# Patient Record
Sex: FEMALE | Race: WHITE | NOT HISPANIC OR LATINO | Employment: FULL TIME | URBAN - METROPOLITAN AREA
[De-identification: names, ages, dates, MRNs, and addresses within clinical notes are randomized per-mention and may not be internally consistent; named-entity substitution may affect disease eponyms.]

---

## 2017-09-19 ENCOUNTER — HOSPITAL ENCOUNTER (EMERGENCY)
Facility: MEDICAL CENTER | Age: 22
End: 2017-09-19
Attending: EMERGENCY MEDICINE
Payer: COMMERCIAL

## 2017-09-19 ENCOUNTER — APPOINTMENT (OUTPATIENT)
Dept: RADIOLOGY | Facility: MEDICAL CENTER | Age: 22
End: 2017-09-19
Attending: EMERGENCY MEDICINE
Payer: COMMERCIAL

## 2017-09-19 VITALS
TEMPERATURE: 98 F | OXYGEN SATURATION: 96 % | HEART RATE: 70 BPM | RESPIRATION RATE: 16 BRPM | HEIGHT: 62 IN | SYSTOLIC BLOOD PRESSURE: 141 MMHG | WEIGHT: 151.46 LBS | DIASTOLIC BLOOD PRESSURE: 68 MMHG | BODY MASS INDEX: 27.87 KG/M2

## 2017-09-19 DIAGNOSIS — N30.90 CYSTITIS: ICD-10-CM

## 2017-09-19 LAB
ALBUMIN SERPL BCP-MCNC: 4.2 G/DL (ref 3.2–4.9)
ALBUMIN/GLOB SERPL: 1.3 G/DL
ALP SERPL-CCNC: 75 U/L (ref 30–99)
ALT SERPL-CCNC: 11 U/L (ref 2–50)
ANION GAP SERPL CALC-SCNC: 8 MMOL/L (ref 0–11.9)
APPEARANCE UR: CLEAR
AST SERPL-CCNC: 15 U/L (ref 12–45)
BACTERIA #/AREA URNS HPF: ABNORMAL /HPF
BACTERIA GENITAL QL WET PREP: NORMAL
BASOPHILS # BLD AUTO: 0.5 % (ref 0–1.8)
BASOPHILS # BLD: 0.03 K/UL (ref 0–0.12)
BILIRUB SERPL-MCNC: 0.3 MG/DL (ref 0.1–1.5)
BILIRUB UR QL STRIP.AUTO: NEGATIVE
BUN SERPL-MCNC: 11 MG/DL (ref 8–22)
C TRACH DNA SPEC QL NAA+PROBE: NEGATIVE
CALCIUM SERPL-MCNC: 9.5 MG/DL (ref 8.5–10.5)
CHLORIDE SERPL-SCNC: 109 MMOL/L (ref 96–112)
CO2 SERPL-SCNC: 21 MMOL/L (ref 20–33)
COLOR UR: YELLOW
CREAT SERPL-MCNC: 0.55 MG/DL (ref 0.5–1.4)
EOSINOPHIL # BLD AUTO: 0.17 K/UL (ref 0–0.51)
EOSINOPHIL NFR BLD: 2.6 % (ref 0–6.9)
EPI CELLS #/AREA URNS HPF: ABNORMAL /HPF
ERYTHROCYTE [DISTWIDTH] IN BLOOD BY AUTOMATED COUNT: 38.4 FL (ref 35.9–50)
GFR SERPL CREATININE-BSD FRML MDRD: >60 ML/MIN/1.73 M 2
GLOBULIN SER CALC-MCNC: 3.3 G/DL (ref 1.9–3.5)
GLUCOSE SERPL-MCNC: 90 MG/DL (ref 65–99)
GLUCOSE UR STRIP.AUTO-MCNC: NEGATIVE MG/DL
HCG SERPL QL: NEGATIVE
HCT VFR BLD AUTO: 42.2 % (ref 37–47)
HGB BLD-MCNC: 14.4 G/DL (ref 12–16)
HYALINE CASTS #/AREA URNS LPF: ABNORMAL /LPF
IMM GRANULOCYTES # BLD AUTO: 0.01 K/UL (ref 0–0.11)
IMM GRANULOCYTES NFR BLD AUTO: 0.2 % (ref 0–0.9)
KETONES UR STRIP.AUTO-MCNC: NEGATIVE MG/DL
LEUKOCYTE ESTERASE UR QL STRIP.AUTO: ABNORMAL
LIPASE SERPL-CCNC: 25 U/L (ref 11–82)
LYMPHOCYTES # BLD AUTO: 3.28 K/UL (ref 1–4.8)
LYMPHOCYTES NFR BLD: 50.2 % (ref 22–41)
MCH RBC QN AUTO: 30.1 PG (ref 27–33)
MCHC RBC AUTO-ENTMCNC: 34.1 G/DL (ref 33.6–35)
MCV RBC AUTO: 88.1 FL (ref 81.4–97.8)
MICRO URNS: ABNORMAL
MONOCYTES # BLD AUTO: 0.39 K/UL (ref 0–0.85)
MONOCYTES NFR BLD AUTO: 6 % (ref 0–13.4)
N GONORRHOEA DNA SPEC QL NAA+PROBE: NEGATIVE
NEUTROPHILS # BLD AUTO: 2.66 K/UL (ref 2–7.15)
NEUTROPHILS NFR BLD: 40.5 % (ref 44–72)
NITRITE UR QL STRIP.AUTO: NEGATIVE
NRBC # BLD AUTO: 0 K/UL
NRBC BLD AUTO-RTO: 0 /100 WBC
PH UR STRIP.AUTO: 5.5 [PH]
PLATELET # BLD AUTO: 200 K/UL (ref 164–446)
PMV BLD AUTO: 10.1 FL (ref 9–12.9)
POTASSIUM SERPL-SCNC: 3.8 MMOL/L (ref 3.6–5.5)
PROT SERPL-MCNC: 7.5 G/DL (ref 6–8.2)
PROT UR QL STRIP: NEGATIVE MG/DL
RBC # BLD AUTO: 4.79 M/UL (ref 4.2–5.4)
RBC # URNS HPF: ABNORMAL /HPF
RBC UR QL AUTO: ABNORMAL
SIGNIFICANT IND 70042: NORMAL
SITE SITE: NORMAL
SODIUM SERPL-SCNC: 138 MMOL/L (ref 135–145)
SOURCE SOURCE: NORMAL
SP GR UR STRIP.AUTO: 1.02
SPECIMEN SOURCE: NORMAL
UROBILINOGEN UR STRIP.AUTO-MCNC: 0.2 MG/DL
WBC # BLD AUTO: 6.5 K/UL (ref 4.8–10.8)
WBC #/AREA URNS HPF: ABNORMAL /HPF

## 2017-09-19 PROCEDURE — 87591 N.GONORRHOEAE DNA AMP PROB: CPT

## 2017-09-19 PROCEDURE — 81001 URINALYSIS AUTO W/SCOPE: CPT

## 2017-09-19 PROCEDURE — 83690 ASSAY OF LIPASE: CPT

## 2017-09-19 PROCEDURE — 36415 COLL VENOUS BLD VENIPUNCTURE: CPT

## 2017-09-19 PROCEDURE — 76830 TRANSVAGINAL US NON-OB: CPT

## 2017-09-19 PROCEDURE — 87491 CHLMYD TRACH DNA AMP PROBE: CPT

## 2017-09-19 PROCEDURE — 84703 CHORIONIC GONADOTROPIN ASSAY: CPT

## 2017-09-19 PROCEDURE — 85025 COMPLETE CBC W/AUTO DIFF WBC: CPT

## 2017-09-19 PROCEDURE — 80053 COMPREHEN METABOLIC PANEL: CPT

## 2017-09-19 PROCEDURE — A9270 NON-COVERED ITEM OR SERVICE: HCPCS | Performed by: EMERGENCY MEDICINE

## 2017-09-19 PROCEDURE — 700102 HCHG RX REV CODE 250 W/ 637 OVERRIDE(OP): Performed by: EMERGENCY MEDICINE

## 2017-09-19 PROCEDURE — 99284 EMERGENCY DEPT VISIT MOD MDM: CPT

## 2017-09-19 RX ORDER — NITROFURANTOIN 25; 75 MG/1; MG/1
100 CAPSULE ORAL 2 TIMES DAILY
Qty: 14 CAP | Refills: 0 | Status: SHIPPED | OUTPATIENT
Start: 2017-09-19 | End: 2017-09-26

## 2017-09-19 RX ORDER — NITROFURANTOIN 25; 75 MG/1; MG/1
100 CAPSULE ORAL 2 TIMES DAILY
Qty: 14 CAP | Refills: 0 | Status: SHIPPED | OUTPATIENT
Start: 2017-09-19 | End: 2017-09-19

## 2017-09-19 RX ORDER — NITROFURANTOIN 25; 75 MG/1; MG/1
100 CAPSULE ORAL ONCE
Status: COMPLETED | OUTPATIENT
Start: 2017-09-19 | End: 2017-09-19

## 2017-09-19 RX ADMIN — NITROFURANTOIN (MONOHYDRATE/MACROCRYSTALS) 100 MG: 75; 25 CAPSULE ORAL at 06:49

## 2017-09-19 ASSESSMENT — LIFESTYLE VARIABLES: DO YOU DRINK ALCOHOL: NO

## 2017-09-19 ASSESSMENT — PAIN SCALES - GENERAL: PAINLEVEL_OUTOF10: 7

## 2017-09-19 NOTE — ED NOTES
Pt. Ambulated to Red 11 with steady gait. Pt. States she has been having severe abdominal cramping. Pt. Also states that she is on her period, but that it is lighter than usual. Urine sample obtained and sent to lab. Pt. Updated on the POC for ERP to see Call light within reach. Will continue to monitor.

## 2017-09-19 NOTE — ED NOTES
Discharge instructions given to patient, prescriptions provided, a verbal understanding of all instructions was stated.  Pt preferred to walk out accompanied by boyfriend. VSS,  all belongings accounted for.

## 2017-09-19 NOTE — DISCHARGE INSTRUCTIONS
Urinary Tract Infection  A urinary tract infection (UTI) can occur any place along the urinary tract. The tract includes the kidneys, ureters, bladder, and urethra. A type of germ called bacteria often causes a UTI. UTIs are often helped with antibiotic medicine.   HOME CARE   · If given, take antibiotics as told by your doctor. Finish them even if you start to feel better.  · Drink enough fluids to keep your pee (urine) clear or pale yellow.  · Avoid tea, drinks with caffeine, and bubbly (carbonated) drinks.  · Pee often. Avoid holding your pee in for a long time.  · Pee before and after having sex (intercourse).  · Wipe from front to back after you poop (bowel movement) if you are a woman. Use each tissue only once.  GET HELP RIGHT AWAY IF:   · You have back pain.  · You have lower belly (abdominal) pain.  · You have chills.  · You feel sick to your stomach (nauseous).  · You throw up (vomit).  · Your burning or discomfort with peeing does not go away.  · You have a fever.  · Your symptoms are not better in 3 days.  MAKE SURE YOU:   · Understand these instructions.  · Will watch your condition.  · Will get help right away if you are not doing well or get worse.     This information is not intended to replace advice given to you by your health care provider. Make sure you discuss any questions you have with your health care provider.     Document Released: 06/05/2009 Document Revised: 01/08/2016 Document Reviewed: 07/18/2013  [employer] Interactive Patient Education ©2016 [employer]

## 2017-09-19 NOTE — ED PROVIDER NOTES
"ED Provider Note    CHIEF COMPLAINT  Chief Complaint   Patient presents with   • Abdominal Cramps     Mid-low abdomen and RLQ x3 weeks, intermittent cramps after intercourse.         HPI  Vidhya Abdullahi is a 22 y.o. female who presentsWith lower abdominal pain and cramping. Patient started having periodic abdominal pain over the last several weeks. It is located in her lower pelvis worse on the right side. She notices it more after intercourse. Does not have pain with intercourse. She's had urinary frequency and dysuria. Her pain does not radiate to the back or flank. She has not had a fever. No vaginal discharge. She is currently menstruating. Reports this seems to be a lighter period than normal. No dizziness or lightheadedness. No chest pain shortness of breath, cough, abnormal bowel movements    Patient reports that her pain lasted for about an hour today and is now gone.    REVIEW OF SYSTEMS  As per HPI  All other systems are negative.     PAST MEDICAL HISTORY  Past Medical History:   Diagnosis Date   • Depression    • No known health problems    No STDs in the past.    FAMILY HISTORY  History reviewed. No pertinent family history.    SOCIAL HISTORY  Social History   Substance Use Topics   • Smoking status: Former Smoker     Packs/day: 1.00     Types: Cigarettes   • Smokeless tobacco: Never Used   • Alcohol use No       SURGICAL HISTORY  No past surgical history on file.    CURRENT MEDICATIONS  Home Medications    **Home medications have not yet been reviewed for this encounter**         ALLERGIES  No Known Allergies    PHYSICAL EXAM  VITAL SIGNS: /68   Pulse 70   Temp 36.7 °C (98 °F)   Resp 16   Ht 1.575 m (5' 2\")   Wt 68.7 kg (151 lb 7.3 oz)   LMP 09/19/2017   SpO2 96%   BMI 27.70 kg/m²   Constitutional: Awake and alert  HENT: Normal inspection  Eyes: Sclera white  Neck: Normal range of motion  Cardiovascular: Normal heart rate, Normal rhythm  Thorax & Lungs: Normal breath sounds, No respiratory " distress, No wheezing, No chest tenderness.   Abdomen:Soft, nondistended, no tenderness. No rebound or peritonitis.  Genitalia: Normal external female genitalia. Physiologic discharge within the vault. Cervix is normal. Mild bleeding from the os. No cervical motion tenderness, adnexal masses or tenderness  Skin: No rash.   Back: No tenderness, No CVA tenderness.   Extremities: Intact, symmetric distal pulses, no edema.  Neurologic: Grossly normal    RADIOLOGY/PROCEDURES  US-GYN-PELVIS TRANSVAGINAL   Final Result         1.  Anteverted uterus.   2.  Trace free fluid in the cul-de-sac and adjacent to the right adnexa.           Imaging is interpreted by radiologist    Labs:   Results for orders placed or performed during the hospital encounter of 09/19/17   CBC WITH DIFFERENTIAL   Result Value Ref Range    WBC 6.5 4.8 - 10.8 K/uL    RBC 4.79 4.20 - 5.40 M/uL    Hemoglobin 14.4 12.0 - 16.0 g/dL    Hematocrit 42.2 37.0 - 47.0 %    MCV 88.1 81.4 - 97.8 fL    MCH 30.1 27.0 - 33.0 pg    MCHC 34.1 33.6 - 35.0 g/dL    RDW 38.4 35.9 - 50.0 fL    Platelet Count 200 164 - 446 K/uL    MPV 10.1 9.0 - 12.9 fL    Neutrophils-Polys 40.50 (L) 44.00 - 72.00 %    Lymphocytes 50.20 (H) 22.00 - 41.00 %    Monocytes 6.00 0.00 - 13.40 %    Eosinophils 2.60 0.00 - 6.90 %    Basophils 0.50 0.00 - 1.80 %    Immature Granulocytes 0.20 0.00 - 0.90 %    Nucleated RBC 0.00 /100 WBC    Neutrophils (Absolute) 2.66 2.00 - 7.15 K/uL    Lymphs (Absolute) 3.28 1.00 - 4.80 K/uL    Monos (Absolute) 0.39 0.00 - 0.85 K/uL    Eos (Absolute) 0.17 0.00 - 0.51 K/uL    Baso (Absolute) 0.03 0.00 - 0.12 K/uL    Immature Granulocytes (abs) 0.01 0.00 - 0.11 K/uL    NRBC (Absolute) 0.00 K/uL   COMP METABOLIC PANEL   Result Value Ref Range    Sodium 138 135 - 145 mmol/L    Potassium 3.8 3.6 - 5.5 mmol/L    Chloride 109 96 - 112 mmol/L    Co2 21 20 - 33 mmol/L    Anion Gap 8.0 0.0 - 11.9    Glucose 90 65 - 99 mg/dL    Bun 11 8 - 22 mg/dL    Creatinine 0.55 0.50 - 1.40  mg/dL    Calcium 9.5 8.5 - 10.5 mg/dL    AST(SGOT) 15 12 - 45 U/L    ALT(SGPT) 11 2 - 50 U/L    Alkaline Phosphatase 75 30 - 99 U/L    Total Bilirubin 0.3 0.1 - 1.5 mg/dL    Albumin 4.2 3.2 - 4.9 g/dL    Total Protein 7.5 6.0 - 8.2 g/dL    Globulin 3.3 1.9 - 3.5 g/dL    A-G Ratio 1.3 g/dL   LIPASE   Result Value Ref Range    Lipase 25 11 - 82 U/L   ESTIMATED GFR   Result Value Ref Range    GFR If African American >60 >60 mL/min/1.73 m 2    GFR If Non African American >60 >60 mL/min/1.73 m 2   URINALYSIS   Result Value Ref Range    Color Yellow     Character Clear     Specific Gravity 1.025 <1.035    Ph 5.5 5.0 - 8.0    Glucose Negative Negative mg/dL    Ketones Negative Negative mg/dL    Protein Negative Negative mg/dL    Bilirubin Negative Negative    Urobilinogen, Urine 0.2 Negative    Nitrite Negative Negative    Leukocyte Esterase Small (A) Negative    Occult Blood Trace (A) Negative    Micro Urine Req Microscopic    HCG QUAL SERUM   Result Value Ref Range    Beta-Hcg Qualitative Serum Negative Negative   URINE MICROSCOPIC (W/UA)   Result Value Ref Range    WBC  (A) /hpf    RBC  (A) /hpf    Bacteria Many (A) None /hpf    Epithelial Cells Few /hpf    Hyaline Cast 0-2 /lpf   WET PREP   Result Value Ref Range    Significant Indicator NEG     Source GEN     Site VAGINAL     Wet Prep For Parasites       No yeast.  No motile Trichomonas seen.  No clue cells seen.     CHLAMYDIA & GC BY PCR   Result Value Ref Range    Source Vaginal          COURSE & MEDICAL DECISION MAKING  The patient presents to the ER with pelvic pain. Differential includes UTI, ovarian cyst, ovarian torsion, mittelschmerz, PID, cervicitis, appendicitis etc. Her exam is reassuring. Obtained workup. She has not had any large cysts. She does have a small amount of free fluid in the right pelvis. Perhaps related to this pain. Pelvic exam was unremarkable. No suggestion of PID. Wet prep was negative. Await GC and chlamydia probe. Urinalysis  revealed urinary tract infection she does not have upper tract symptoms. She will be treated with Macrobid. Given 1st dose in the ER. Advised plenty of fluids. Given standard instructions for UTI. Advised follow-up with primary doctor in 1 week. Return to the ER for concern.    FINAL IMPRESSION  1. Right lower quadrant abdominal pain  2. Urinary tract infection        This dictation was created using voice recognition software. The accuracy of the dictation is limited to the abilities of the software.  The nursing notes were reviewed and certain aspects of this information were incorporated into this note.    Electronically signed by: Rubio Christie, 9/19/2017 10:20 AM

## 2017-09-19 NOTE — ED NOTES
Vidhya Abdullahi  22 y.o.  Chief Complaint   Patient presents with   • Abdominal Cramps     Mid-low abdomen and RLQ x3 weeks, intermittent cramps after intercourse,      Patient denies n/v/d, no difficulty or pain voiding, no abnormal vaginal discharge, reports 1 sexual partner since onset of symptoms.         Explained wait time and triage process to pt. Pt placed back out in lobby, told to notify ED tech or triage RN of any changes, verbalized understanding.

## 2018-09-30 ENCOUNTER — HOSPITAL ENCOUNTER (EMERGENCY)
Facility: MEDICAL CENTER | Age: 23
End: 2018-09-30
Attending: EMERGENCY MEDICINE
Payer: COMMERCIAL

## 2018-09-30 VITALS
WEIGHT: 137.13 LBS | SYSTOLIC BLOOD PRESSURE: 118 MMHG | HEART RATE: 86 BPM | HEIGHT: 62 IN | TEMPERATURE: 97.9 F | RESPIRATION RATE: 16 BRPM | BODY MASS INDEX: 25.23 KG/M2 | DIASTOLIC BLOOD PRESSURE: 74 MMHG | OXYGEN SATURATION: 98 %

## 2018-09-30 DIAGNOSIS — N73.9 PID (PELVIC INFLAMMATORY DISEASE): ICD-10-CM

## 2018-09-30 DIAGNOSIS — N39.0 ACUTE UTI: ICD-10-CM

## 2018-09-30 DIAGNOSIS — Z20.2 SYPHILIS CONTACT: ICD-10-CM

## 2018-09-30 LAB
APPEARANCE UR: ABNORMAL
BACTERIA #/AREA URNS HPF: ABNORMAL /HPF
BACTERIA GENITAL QL WET PREP: NORMAL
BILIRUB UR QL STRIP.AUTO: NEGATIVE
COLOR UR: YELLOW
EPI CELLS #/AREA URNS HPF: ABNORMAL /HPF
GLUCOSE UR STRIP.AUTO-MCNC: NEGATIVE MG/DL
HCG UR QL: NEGATIVE
HCG UR QL: NEGATIVE
HYALINE CASTS #/AREA URNS LPF: ABNORMAL /LPF
KETONES UR STRIP.AUTO-MCNC: NEGATIVE MG/DL
LEUKOCYTE ESTERASE UR QL STRIP.AUTO: ABNORMAL
MICRO URNS: ABNORMAL
NITRITE UR QL STRIP.AUTO: POSITIVE
PH UR STRIP.AUTO: 7.5 [PH]
PROT UR QL STRIP: NEGATIVE MG/DL
RBC # URNS HPF: >150 /HPF
RBC UR QL AUTO: ABNORMAL
SIGNIFICANT IND 70042: NORMAL
SITE SITE: NORMAL
SOURCE SOURCE: NORMAL
SP GR UR REFRACTOMETRY: 1.02
SP GR UR STRIP.AUTO: 1.02
UROBILINOGEN UR STRIP.AUTO-MCNC: 1 MG/DL
WBC #/AREA URNS HPF: ABNORMAL /HPF

## 2018-09-30 PROCEDURE — 87077 CULTURE AEROBIC IDENTIFY: CPT

## 2018-09-30 PROCEDURE — 87591 N.GONORRHOEAE DNA AMP PROB: CPT

## 2018-09-30 PROCEDURE — 81001 URINALYSIS AUTO W/SCOPE: CPT

## 2018-09-30 PROCEDURE — 700111 HCHG RX REV CODE 636 W/ 250 OVERRIDE (IP): Performed by: EMERGENCY MEDICINE

## 2018-09-30 PROCEDURE — 99284 EMERGENCY DEPT VISIT MOD MDM: CPT

## 2018-09-30 PROCEDURE — 87491 CHLMYD TRACH DNA AMP PROBE: CPT

## 2018-09-30 PROCEDURE — 87086 URINE CULTURE/COLONY COUNT: CPT

## 2018-09-30 PROCEDURE — 700102 HCHG RX REV CODE 250 W/ 637 OVERRIDE(OP): Performed by: EMERGENCY MEDICINE

## 2018-09-30 PROCEDURE — 700101 HCHG RX REV CODE 250: Performed by: EMERGENCY MEDICINE

## 2018-09-30 PROCEDURE — 96372 THER/PROPH/DIAG INJ SC/IM: CPT

## 2018-09-30 PROCEDURE — 81025 URINE PREGNANCY TEST: CPT

## 2018-09-30 PROCEDURE — A9270 NON-COVERED ITEM OR SERVICE: HCPCS | Performed by: EMERGENCY MEDICINE

## 2018-09-30 RX ORDER — NITROFURANTOIN 25; 75 MG/1; MG/1
100 CAPSULE ORAL 2 TIMES DAILY
Qty: 20 CAP | Refills: 0 | Status: SHIPPED
Start: 2018-09-30 | End: 2018-10-26

## 2018-09-30 RX ORDER — CEFTRIAXONE SODIUM 250 MG/1
250 INJECTION, POWDER, FOR SOLUTION INTRAMUSCULAR; INTRAVENOUS ONCE
Status: COMPLETED | OUTPATIENT
Start: 2018-09-30 | End: 2018-09-30

## 2018-09-30 RX ORDER — AZITHROMYCIN 250 MG/1
1000 TABLET, FILM COATED ORAL ONCE
Status: COMPLETED | OUTPATIENT
Start: 2018-09-30 | End: 2018-09-30

## 2018-09-30 RX ORDER — ONDANSETRON 4 MG/1
4 TABLET, ORALLY DISINTEGRATING ORAL ONCE
Status: COMPLETED | OUTPATIENT
Start: 2018-09-30 | End: 2018-09-30

## 2018-09-30 RX ORDER — METRONIDAZOLE 500 MG/1
500 TABLET ORAL 2 TIMES DAILY
Qty: 14 TAB | Refills: 0 | Status: SHIPPED
Start: 2018-09-30 | End: 2018-10-26

## 2018-09-30 RX ADMIN — LIDOCAINE HYDROCHLORIDE 20 ML: 10 INJECTION, SOLUTION INFILTRATION; PERINEURAL at 17:02

## 2018-09-30 RX ADMIN — AZITHROMYCIN MONOHYDRATE 1000 MG: 250 TABLET ORAL at 16:50

## 2018-09-30 RX ADMIN — PENICILLIN G BENZATHINE 2.4 MILLION UNITS: 1200000 INJECTION, SUSPENSION INTRAMUSCULAR at 16:52

## 2018-09-30 RX ADMIN — ONDANSETRON 4 MG: 4 TABLET, ORALLY DISINTEGRATING ORAL at 16:50

## 2018-09-30 RX ADMIN — CEFTRIAXONE SODIUM 250 MG: 250 INJECTION, POWDER, FOR SOLUTION INTRAMUSCULAR; INTRAVENOUS at 16:51

## 2018-09-30 ASSESSMENT — PAIN SCALES - GENERAL: PAINLEVEL_OUTOF10: 7

## 2018-09-30 NOTE — ED TRIAGE NOTES
"Pt ambulatory to triage stating \"my boyfriend just found out he has syphilis & I know I have it too because we don't use protection.\"  Pt reports vaginal discharge & severe pelvic pain x 2 months.  Pt also reports that she has had a recurrent UTI but has been unable to afford antibiotics to treat.    "

## 2018-09-30 NOTE — LETTER
10/3/2018               Vidhya M Bradly  [address]  [place] [address]        Dear Vidhya (MR#[id number])    As we have been unable to contact you by phone, this letter is sent in regards to your, recent visit to the [facility] Emergency Department on 9/30/2018.  During the visit, tests were performed to assist the physician in a medical diagnosis.  A review of those tests requires that we notify you of the following:    Your culture test was positive for Gonorrhea, a sexually transmitted infection. This was already treated appropriately in the Emergency Department.       Based on the above findings it is recommended that you seek testing for the presence of additional sexually transmitted infections from the Health Department. Also, it is advised that you inform your sexual partner(s) within the previous 60 days of the above findings and direct them to the Health Department for testing. Should your symptoms progress, it is important that you follow up with your primary care physician, your local urgent care office, or return to the emergency department for further work up in order to prevent long term health issues.      Thank you for your cooperation in the matter.    Sincerely,  ED Culture Follow-Up Staff  Nicolette Nur, PharmD    [facility], Emergency Department  [address], [place] [address]  [phone] (ED Culture Line)  [phone]

## 2018-09-30 NOTE — ED PROVIDER NOTES
ED Provider Note    CHIEF COMPLAINT  Chief Complaint   Patient presents with   • Vaginal Discharge   • Pelvic Pain   • UTI       HPI  Vidhya Abdullahi is a 23 y.o. female here for evaluation of vaginal discharge and pelvic pain.  The patient states that she has had a long-term boyfriend of 3 years, that told her that he had syphilis, yesterday.  He has been with other women, but states that he does not know if he has any other sexual transmitted disease.  The patient herself, is here complaining of some vaginal discharge and vaginal pain.  She has no known STDs currently on the past.  She states she would like to be treated for syphilis and anything else that she may have.  She denies any abdominal pain, chest pain, shortness of breath.  She has complaints of vaginal discharge of a yellowish type of color over the last week or so.  She is sexually active with her boyfriend up until recently, unprotected.  She is unsure if she is pregnant or not.  The patient complains of dysuria, urgency and frequency.      PAST MEDICAL HISTORY   has a past medical history of Depression; Drug abuse; and No known health problems.    SOCIAL HISTORY  Social History     Social History Main Topics   • Smoking status: Current Every Day Smoker     Packs/day: 1.00     Types: Cigarettes   • Smokeless tobacco: Never Used   • Alcohol use No   • Drug use: Yes      Comment: iv meth, former heroin use   • Sexual activity: Not on file       SURGICAL HISTORY  patient denies any surgical history    CURRENT MEDICATIONS  Home Medications     Reviewed by Jahaira Melvin R.N. (Registered Nurse) on 09/30/18 at 1439  Med List Status: <None>   Medication Last Dose Status        Patient Sahil Taking any Medications                       ALLERGIES  No Known Allergies    REVIEW OF SYSTEMS  See HPI for further details. Review of systems as above, otherwise all other systems are negative.     PHYSICAL EXAM  VITAL SIGNS: /75   Pulse (!) 116   Temp 36.6 °C  "(97.9 °F)   Resp 20   Ht 1.575 m (5' 2\")   Wt 62.2 kg (137 lb 2 oz)   LMP 09/30/2018 (Approximate)   SpO2 97%   BMI 25.08 kg/m²     Constitutional: Well developed, well nourished. No acute distress.  HEENT: Normocephalic, atraumatic. MMM  Neck: Supple, Full range of motion   Chest/Pulmonary:  No respiratory distress.  Equal expansion   Abdomen; soft, nontender, no guarding.  Musculoskeletal: No deformity, no edema, neurovascular intact.   G/U;  No external lesion.  Yellow / heme in the vaginal vault.  CMT noted.    Neuro: Clear speech, appropriate, cooperative, cranial nerves II-XII grossly intact.  Psych: Normal mood and affect    Results for orders placed or performed during the hospital encounter of 09/30/18   URINALYSIS,CULTURE IF INDICATED   Result Value Ref Range    Color Yellow     Character Turbid (A)     Specific Gravity 1.023 <1.035    Ph 7.5 5.0 - 8.0    Glucose Negative Negative mg/dL    Ketones Negative Negative mg/dL    Protein Negative Negative mg/dL    Bilirubin Negative Negative    Urobilinogen, Urine 1.0 Negative    Nitrite Positive (A) Negative    Leukocyte Esterase Small (A) Negative    Occult Blood Large (A) Negative    Micro Urine Req Microscopic    HCG QUALITATIVE UR (Lab)   Result Value Ref Range    Beta-Hcg Urine Negative Negative   WET PREP   Result Value Ref Range    Significant Indicator NEG     Source GEN     Site VAGINAL     Wet Prep For Parasites       No yeast.  No motile Trichomonas seen.  Few clue cells seen.     CHLAMYDIA & GC BY PCR   Result Value Ref Range    Source Vaginal    REFRACTOMETER SG   Result Value Ref Range    Specific Gravity 1.023    URINE MICROSCOPIC (W/UA)   Result Value Ref Range    WBC 20-50 (A) /hpf    RBC >150 (A) /hpf    Bacteria Many (A) None /hpf    Epithelial Cells Few /hpf    Hyaline Cast 3-5 (A) /lpf   POC URINE PREGNANCY   Result Value Ref Range    POC Urine Pregnancy Test Negative Negative           PROCEDURES     MEDICAL RECORD  I have reviewed " patient's medical record and pertinent results are listed above.    COURSE & MEDICAL DECISION MAKING  I have reviewed any medical record information, laboratory studies and radiographic results as noted above.    Georgia MONCADA present for pelvic exam.  Pt had a fair amount of discomfort with the exam.  With this, and her recent exposure to syphilis, she will be treated for all.    5:29 PM  The patient is nontoxic appearing, afebrile, and states that she needs to leave at this moment so she can go to work.  She has been treated for syphilis exposure, gonorrhea chlamydia, and was sent home with prescriptions for Macrobid for UTI, as well as metronidazole for her BV.  She will return here for any further issues or concerns, and will abstain from any sexual activity for at least 3 weeks.    I you have had any blood pressure issues while here in the emergency department, please see your doctor for a further evaluation or work up.    Differential diagnoses include but not limited to: PID, UTI, BV, trichomoniasis, gonorrhea chlamydia.    This patient presents with a syphilis exposure, and likely PID..  At this time, I have counseled the patient/family regarding their medications, pain control, and follow up.  They will continue their medications, if any, as prescribed.  They will return immediately for any worsening symptoms and/or any other medical concerns.  They will see their doctor, or contact the doctor provided, in 1-2 days for follow up.       FINAL IMPRESSION  1. Acute UTI    2. PID (pelvic inflammatory disease)    3. Syphilis contact    4.  Bacterial vaginosis         Electronically signed by: Min Rodriguez, 9/30/2018 4:51 PM

## 2018-10-01 NOTE — ED NOTES
"Patient states \"I have to leave right now or im going to get fired, i'm late\". Provider notified.   "

## 2018-10-02 LAB
BACTERIA UR CULT: ABNORMAL
BACTERIA UR CULT: ABNORMAL
C TRACH DNA SPEC QL NAA+PROBE: NEGATIVE
N GONORRHOEA DNA SPEC QL NAA+PROBE: POSITIVE
SIGNIFICANT IND 70042: ABNORMAL
SITE SITE: ABNORMAL
SOURCE SOURCE: ABNORMAL
SPECIMEN SOURCE: ABNORMAL

## 2018-10-03 NOTE — ED NOTES
"ED Positive Culture Follow-up/Notification Note:    Date: 10/3/18     Patient seen in the ED on 9/30/2018 for vaginal discharge and pelvic pain. Long-term boyfriend states he has syphilis.  1. Acute UTI    2. PID (pelvic inflammatory disease)    3. Syphilis contact     Given Azithromycin 1 g po, Rocephin 250 mg IM, and Penicillin LA 2.4 million units IM in the ER.    Discharge Medication List as of 9/30/2018  5:26 PM      START taking these medications    Details   nitrofurantoin monohydr macro (MACROBID) 100 MG Cap Take 1 Cap by mouth 2 times a day., Disp-20 Cap, R-0, Print Plain Paper      metroNIDAZOLE (FLAGYL) 500 MG Tab Take 1 Tab by mouth 2 Times a Day., Disp-14 Tab, R-0, Print Plain Paper             Allergies: Patient has no known allergies.     Vitals:    09/30/18 1432 09/30/18 1437 09/30/18 1728   BP: 112/75  118/74   Pulse: (!) 116  86   Resp: 20  16   Temp: 36.6 °C (97.9 °F)     SpO2: 97%  98%   Weight:  62.2 kg (137 lb 2 oz)    Height: 1.575 m (5' 2\")         Final cultures:   Results     Procedure Component Value Units Date/Time    CHLAMYDIA & GC BY PCR [[id number]]  (Abnormal) Collected:  09/30/18 1624    Order Status:  Completed Specimen:  Genital from Genital Updated:  10/02/18 2239     Source Vaginal     C. trachomatis by PCR Negative     N. gonorrhoeae by PCR POSITIVE (A)    Narrative:       ER tel.  10/02/2018, 22:24, RESULTS CALLED TO: ER [phone] and Report Sent [facility]    URINE CULTURE(NEW) [[id number]]  (Abnormal) Collected:  09/30/18 1701    Order Status:  Completed Specimen:  Urine Updated:  10/02/18 0915     Significant Indicator POS (POS)     Source UR     Site --     Urine Culture Mixed skin roland >100,000 cfu/mL (A)      Streptococcus agalactiae (Group B)  10-50,000 cfu/mL   (A)    URINALYSIS,CULTURE IF INDICATED [[id number]]  (Abnormal) Collected:  09/30/18 1701    Order Status:  Completed Specimen:  Urine Updated:  09/30/18 1716     Color Yellow     Character Turbid (A)     Specific Gravity " 1.023     Ph 7.5     Glucose Negative mg/dL      Ketones Negative mg/dL      Protein Negative mg/dL      Bilirubin Negative     Urobilinogen, Urine 1.0     Nitrite Positive (A)     Leukocyte Esterase Small (A)     Occult Blood Large (A)     Micro Urine Req Microscopic    WET PREP [[id number]] Collected:  09/30/18 1624    Order Status:  Completed Specimen:  Genital from Vaginal Updated:  09/30/18 1640     Significant Indicator NEG     Source GEN     Site VAGINAL     Wet Prep For Parasites No yeast.  No motile Trichomonas seen.  Few clue cells seen.            Plan:   Appropriate antibiotic therapy prescribed while the patient was in the ER and upon discharge. No further treatment required.   Attempted to contact the patient, but the patient's phone does not receive incoming calls and no VM is set up. I have sent her a letter to notify of positive gonorrhea result and recommend further follow up with the Health Dept for information/testing.    Nicolette Nur

## 2018-10-26 ENCOUNTER — HOSPITAL ENCOUNTER (EMERGENCY)
Facility: MEDICAL CENTER | Age: 23
End: 2018-10-27
Attending: EMERGENCY MEDICINE | Admitting: OBSTETRICS & GYNECOLOGY
Payer: COMMERCIAL

## 2018-10-26 DIAGNOSIS — N75.0 INFECTED CYST OF BARTHOLIN'S GLAND DUCT: ICD-10-CM

## 2018-10-26 LAB
ANION GAP SERPL CALC-SCNC: 9 MMOL/L (ref 0–11.9)
BASOPHILS # BLD AUTO: 0.4 % (ref 0–1.8)
BASOPHILS # BLD: 0.04 K/UL (ref 0–0.12)
BLOOD CULTURE HOLD CXBCH: NORMAL
BUN SERPL-MCNC: 14 MG/DL (ref 8–22)
CALCIUM SERPL-MCNC: 9.6 MG/DL (ref 8.5–10.5)
CHLORIDE SERPL-SCNC: 103 MMOL/L (ref 96–112)
CO2 SERPL-SCNC: 24 MMOL/L (ref 20–33)
CREAT SERPL-MCNC: 0.75 MG/DL (ref 0.5–1.4)
EOSINOPHIL # BLD AUTO: 0.19 K/UL (ref 0–0.51)
EOSINOPHIL NFR BLD: 1.9 % (ref 0–6.9)
ERYTHROCYTE [DISTWIDTH] IN BLOOD BY AUTOMATED COUNT: 38.8 FL (ref 35.9–50)
GLUCOSE SERPL-MCNC: 100 MG/DL (ref 65–99)
HCT VFR BLD AUTO: 39.9 % (ref 37–47)
HGB BLD-MCNC: 13.7 G/DL (ref 12–16)
IMM GRANULOCYTES # BLD AUTO: 0.03 K/UL (ref 0–0.11)
IMM GRANULOCYTES NFR BLD AUTO: 0.3 % (ref 0–0.9)
LYMPHOCYTES # BLD AUTO: 2.38 K/UL (ref 1–4.8)
LYMPHOCYTES NFR BLD: 24.2 % (ref 22–41)
MCH RBC QN AUTO: 30.6 PG (ref 27–33)
MCHC RBC AUTO-ENTMCNC: 34.3 G/DL (ref 33.6–35)
MCV RBC AUTO: 89.3 FL (ref 81.4–97.8)
MONOCYTES # BLD AUTO: 0.52 K/UL (ref 0–0.85)
MONOCYTES NFR BLD AUTO: 5.3 % (ref 0–13.4)
NEUTROPHILS # BLD AUTO: 6.66 K/UL (ref 2–7.15)
NEUTROPHILS NFR BLD: 67.9 % (ref 44–72)
NRBC # BLD AUTO: 0 K/UL
NRBC BLD-RTO: 0 /100 WBC
PLATELET # BLD AUTO: 199 K/UL (ref 164–446)
PMV BLD AUTO: 9.3 FL (ref 9–12.9)
POTASSIUM SERPL-SCNC: 4.1 MMOL/L (ref 3.6–5.5)
RBC # BLD AUTO: 4.47 M/UL (ref 4.2–5.4)
SODIUM SERPL-SCNC: 136 MMOL/L (ref 135–145)
WBC # BLD AUTO: 9.8 K/UL (ref 4.8–10.8)

## 2018-10-26 PROCEDURE — 700101 HCHG RX REV CODE 250: Performed by: EMERGENCY MEDICINE

## 2018-10-26 PROCEDURE — 96375 TX/PRO/DX INJ NEW DRUG ADDON: CPT

## 2018-10-26 PROCEDURE — 96365 THER/PROPH/DIAG IV INF INIT: CPT

## 2018-10-26 PROCEDURE — 700111 HCHG RX REV CODE 636 W/ 250 OVERRIDE (IP): Performed by: EMERGENCY MEDICINE

## 2018-10-26 PROCEDURE — 87040 BLOOD CULTURE FOR BACTERIA: CPT

## 2018-10-26 PROCEDURE — 99291 CRITICAL CARE FIRST HOUR: CPT

## 2018-10-26 PROCEDURE — 85025 COMPLETE CBC W/AUTO DIFF WBC: CPT

## 2018-10-26 PROCEDURE — 36415 COLL VENOUS BLD VENIPUNCTURE: CPT

## 2018-10-26 PROCEDURE — 80048 BASIC METABOLIC PNL TOTAL CA: CPT

## 2018-10-26 RX ORDER — MORPHINE SULFATE 10 MG/ML
6 INJECTION, SOLUTION INTRAMUSCULAR; INTRAVENOUS ONCE
Status: COMPLETED | OUTPATIENT
Start: 2018-10-26 | End: 2018-10-26

## 2018-10-26 RX ORDER — CLINDAMYCIN PHOSPHATE 600 MG/50ML
600 INJECTION, SOLUTION INTRAVENOUS ONCE
Status: COMPLETED | OUTPATIENT
Start: 2018-10-26 | End: 2018-10-27

## 2018-10-26 RX ORDER — ONDANSETRON 2 MG/ML
4 INJECTION INTRAMUSCULAR; INTRAVENOUS ONCE
Status: COMPLETED | OUTPATIENT
Start: 2018-10-26 | End: 2018-10-26

## 2018-10-26 RX ADMIN — MORPHINE SULFATE 6 MG: 10 INJECTION INTRAVENOUS at 21:52

## 2018-10-26 RX ADMIN — ONDANSETRON 4 MG: 2 INJECTION INTRAMUSCULAR; INTRAVENOUS at 21:52

## 2018-10-26 RX ADMIN — CLINDAMYCIN IN 5 PERCENT DEXTROSE 600 MG: 12 INJECTION, SOLUTION INTRAVENOUS at 23:40

## 2018-10-26 ASSESSMENT — LIFESTYLE VARIABLES: DO YOU DRINK ALCOHOL: NO

## 2018-10-26 ASSESSMENT — PAIN SCALES - GENERAL: PAINLEVEL_OUTOF10: 8

## 2018-10-26 ASSESSMENT — PAIN DESCRIPTION - DESCRIPTORS: DESCRIPTORS: THROBBING

## 2018-10-27 VITALS
HEART RATE: 71 BPM | OXYGEN SATURATION: 98 % | SYSTOLIC BLOOD PRESSURE: 107 MMHG | RESPIRATION RATE: 16 BRPM | TEMPERATURE: 98.4 F | HEIGHT: 61 IN | DIASTOLIC BLOOD PRESSURE: 63 MMHG | WEIGHT: 137.57 LBS | BODY MASS INDEX: 25.97 KG/M2

## 2018-10-27 PROBLEM — N75.0 INFECTED CYST OF BARTHOLIN'S GLAND DUCT: Status: ACTIVE | Noted: 2018-10-27

## 2018-10-27 LAB
AMPHET UR QL SCN: POSITIVE
BARBITURATES UR QL SCN: NEGATIVE
BENZODIAZ UR QL SCN: NEGATIVE
BZE UR QL SCN: NEGATIVE
CANNABINOIDS UR QL SCN: NEGATIVE
GRAM STN SPEC: NORMAL
HIV 1+2 AB+HIV1 P24 AG SERPL QL IA: NON REACTIVE
METHADONE UR QL SCN: NEGATIVE
OPIATES UR QL SCN: POSITIVE
OXYCODONE UR QL SCN: NEGATIVE
PCP UR QL SCN: NEGATIVE
PROPOXYPH UR QL SCN: NEGATIVE
RPR SER QL: REACTIVE
RPR SER-TITR: NORMAL {TITER}
SIGNIFICANT IND 70042: NORMAL
SITE SITE: NORMAL
SOURCE SOURCE: NORMAL
TREPONEMA PALLIDUM IGG+IGM AB [PRESENCE] IN SERUM OR PLASMA BY IMMUNOASSAY: REACTIVE

## 2018-10-27 PROCEDURE — 700102 HCHG RX REV CODE 250 W/ 637 OVERRIDE(OP): Performed by: ANESTHESIOLOGY

## 2018-10-27 PROCEDURE — 700101 HCHG RX REV CODE 250

## 2018-10-27 PROCEDURE — 80307 DRUG TEST PRSMV CHEM ANLYZR: CPT

## 2018-10-27 PROCEDURE — 160002 HCHG RECOVERY MINUTES (STAT): Performed by: OBSTETRICS & GYNECOLOGY

## 2018-10-27 PROCEDURE — 87186 SC STD MICRODIL/AGAR DIL: CPT

## 2018-10-27 PROCEDURE — 160009 HCHG ANES TIME/MIN: Performed by: OBSTETRICS & GYNECOLOGY

## 2018-10-27 PROCEDURE — A9270 NON-COVERED ITEM OR SERVICE: HCPCS | Performed by: ANESTHESIOLOGY

## 2018-10-27 PROCEDURE — 99242 OFF/OP CONSLTJ NEW/EST SF 20: CPT | Mod: 25 | Performed by: OBSTETRICS & GYNECOLOGY

## 2018-10-27 PROCEDURE — 160027 HCHG SURGERY MINUTES - 1ST 30 MINS LEVEL 2: Performed by: OBSTETRICS & GYNECOLOGY

## 2018-10-27 PROCEDURE — 87205 SMEAR GRAM STAIN: CPT

## 2018-10-27 PROCEDURE — 87075 CULTR BACTERIA EXCEPT BLOOD: CPT

## 2018-10-27 PROCEDURE — 700111 HCHG RX REV CODE 636 W/ 250 OVERRIDE (IP): Performed by: ANESTHESIOLOGY

## 2018-10-27 PROCEDURE — 56420 I&D BARTHOLINS GLAND ABSCESS: CPT | Performed by: OBSTETRICS & GYNECOLOGY

## 2018-10-27 PROCEDURE — 86593 SYPHILIS TEST NON-TREP QUANT: CPT

## 2018-10-27 PROCEDURE — 87077 CULTURE AEROBIC IDENTIFY: CPT

## 2018-10-27 PROCEDURE — 700111 HCHG RX REV CODE 636 W/ 250 OVERRIDE (IP)

## 2018-10-27 PROCEDURE — 160048 HCHG OR STATISTICAL LEVEL 1-5: Performed by: OBSTETRICS & GYNECOLOGY

## 2018-10-27 PROCEDURE — 160035 HCHG PACU - 1ST 60 MINS PHASE I: Performed by: OBSTETRICS & GYNECOLOGY

## 2018-10-27 PROCEDURE — 87076 CULTURE ANAEROBE IDENT EACH: CPT

## 2018-10-27 PROCEDURE — 86780 TREPONEMA PALLIDUM: CPT

## 2018-10-27 PROCEDURE — 87070 CULTURE OTHR SPECIMN AEROBIC: CPT

## 2018-10-27 PROCEDURE — 87102 FUNGUS ISOLATION CULTURE: CPT

## 2018-10-27 PROCEDURE — 160036 HCHG PACU - EA ADDL 30 MINS PHASE I: Performed by: OBSTETRICS & GYNECOLOGY

## 2018-10-27 PROCEDURE — 87389 HIV-1 AG W/HIV-1&-2 AB AG IA: CPT

## 2018-10-27 PROCEDURE — 86592 SYPHILIS TEST NON-TREP QUAL: CPT

## 2018-10-27 RX ORDER — OXYCODONE HCL 5 MG/5 ML
10 SOLUTION, ORAL ORAL
Status: DISCONTINUED | OUTPATIENT
Start: 2018-10-27 | End: 2018-10-27 | Stop reason: HOSPADM

## 2018-10-27 RX ORDER — MIDAZOLAM HYDROCHLORIDE 1 MG/ML
1 INJECTION INTRAMUSCULAR; INTRAVENOUS
Status: DISCONTINUED | OUTPATIENT
Start: 2018-10-27 | End: 2018-10-27 | Stop reason: HOSPADM

## 2018-10-27 RX ORDER — HYDROMORPHONE HYDROCHLORIDE 2 MG/ML
0.2 INJECTION, SOLUTION INTRAMUSCULAR; INTRAVENOUS; SUBCUTANEOUS
Status: DISCONTINUED | OUTPATIENT
Start: 2018-10-27 | End: 2018-10-27 | Stop reason: HOSPADM

## 2018-10-27 RX ORDER — ONDANSETRON 2 MG/ML
4 INJECTION INTRAMUSCULAR; INTRAVENOUS
Status: DISCONTINUED | OUTPATIENT
Start: 2018-10-27 | End: 2018-10-27 | Stop reason: HOSPADM

## 2018-10-27 RX ORDER — HALOPERIDOL 5 MG/ML
INJECTION INTRAMUSCULAR
Status: DISCONTINUED
Start: 2018-10-27 | End: 2018-10-27 | Stop reason: HOSPADM

## 2018-10-27 RX ORDER — OXYCODONE HYDROCHLORIDE 5 MG/1
10 TABLET ORAL
Status: DISCONTINUED | OUTPATIENT
Start: 2018-10-27 | End: 2018-10-27 | Stop reason: HOSPADM

## 2018-10-27 RX ORDER — IBUPROFEN 800 MG/1
800 TABLET ORAL EVERY 8 HOURS PRN
Qty: 30 TAB | Refills: 0 | Status: SHIPPED | OUTPATIENT
Start: 2018-10-27 | End: 2019-02-01

## 2018-10-27 RX ORDER — MAGNESIUM HYDROXIDE 1200 MG/15ML
LIQUID ORAL
Status: COMPLETED | OUTPATIENT
Start: 2018-10-27 | End: 2018-10-27

## 2018-10-27 RX ORDER — MORPHINE SULFATE 10 MG/ML
5 INJECTION, SOLUTION INTRAMUSCULAR; INTRAVENOUS
Status: DISCONTINUED | OUTPATIENT
Start: 2018-10-27 | End: 2018-10-27 | Stop reason: HOSPADM

## 2018-10-27 RX ORDER — MEPERIDINE HYDROCHLORIDE 25 MG/ML
12.5 INJECTION INTRAMUSCULAR; INTRAVENOUS; SUBCUTANEOUS
Status: DISCONTINUED | OUTPATIENT
Start: 2018-10-27 | End: 2018-10-27 | Stop reason: HOSPADM

## 2018-10-27 RX ORDER — METRONIDAZOLE 500 MG/1
500 TABLET ORAL 2 TIMES DAILY
Qty: 28 TAB | Refills: 0 | Status: SHIPPED | OUTPATIENT
Start: 2018-10-27 | End: 2018-10-29

## 2018-10-27 RX ORDER — OXYCODONE HCL 5 MG/5 ML
5 SOLUTION, ORAL ORAL
Status: DISCONTINUED | OUTPATIENT
Start: 2018-10-27 | End: 2018-10-27 | Stop reason: HOSPADM

## 2018-10-27 RX ORDER — DIPHENHYDRAMINE HYDROCHLORIDE 50 MG/ML
12.5 INJECTION INTRAMUSCULAR; INTRAVENOUS
Status: DISCONTINUED | OUTPATIENT
Start: 2018-10-27 | End: 2018-10-27 | Stop reason: HOSPADM

## 2018-10-27 RX ORDER — HYDROMORPHONE HYDROCHLORIDE 2 MG/ML
0.1 INJECTION, SOLUTION INTRAMUSCULAR; INTRAVENOUS; SUBCUTANEOUS
Status: DISCONTINUED | OUTPATIENT
Start: 2018-10-27 | End: 2018-10-27 | Stop reason: HOSPADM

## 2018-10-27 RX ORDER — KETOROLAC TROMETHAMINE 30 MG/ML
INJECTION, SOLUTION INTRAMUSCULAR; INTRAVENOUS
Status: DISCONTINUED
Start: 2018-10-27 | End: 2018-10-27 | Stop reason: HOSPADM

## 2018-10-27 RX ORDER — HYDROMORPHONE HYDROCHLORIDE 2 MG/ML
0.4 INJECTION, SOLUTION INTRAMUSCULAR; INTRAVENOUS; SUBCUTANEOUS
Status: DISCONTINUED | OUTPATIENT
Start: 2018-10-27 | End: 2018-10-27 | Stop reason: HOSPADM

## 2018-10-27 RX ORDER — SODIUM CHLORIDE, SODIUM LACTATE, POTASSIUM CHLORIDE, CALCIUM CHLORIDE 600; 310; 30; 20 MG/100ML; MG/100ML; MG/100ML; MG/100ML
INJECTION, SOLUTION INTRAVENOUS CONTINUOUS
Status: DISCONTINUED | OUTPATIENT
Start: 2018-10-27 | End: 2018-10-27 | Stop reason: HOSPADM

## 2018-10-27 RX ORDER — OXYCODONE HYDROCHLORIDE 5 MG/1
5 TABLET ORAL
Status: DISCONTINUED | OUTPATIENT
Start: 2018-10-27 | End: 2018-10-27 | Stop reason: HOSPADM

## 2018-10-27 RX ORDER — DOXYCYCLINE 100 MG/1
100 CAPSULE ORAL 2 TIMES DAILY
Qty: 28 CAP | Refills: 0 | Status: SHIPPED | OUTPATIENT
Start: 2018-10-27 | End: 2018-10-29

## 2018-10-27 RX ORDER — MORPHINE SULFATE 10 MG/ML
INJECTION, SOLUTION INTRAMUSCULAR; INTRAVENOUS
Status: COMPLETED
Start: 2018-10-27 | End: 2018-10-27

## 2018-10-27 RX ADMIN — SODIUM CHLORIDE, SODIUM LACTATE, POTASSIUM CHLORIDE, CALCIUM CHLORIDE: 600; 310; 30; 20 INJECTION, SOLUTION INTRAVENOUS at 04:15

## 2018-10-27 RX ADMIN — MORPHINE SULFATE 5 MG: 10 INJECTION INTRAVENOUS at 02:45

## 2018-10-27 ASSESSMENT — PAIN SCALES - GENERAL
PAINLEVEL_OUTOF10: 0
PAINLEVEL_OUTOF10: 0
PAINLEVEL_OUTOF10: 9
PAINLEVEL_OUTOF10: 0
PAINLEVEL_OUTOF10: 0

## 2018-10-27 NOTE — DISCHARGE INSTRUCTIONS
Bartholin Cyst or Abscess  Introduction  A Bartholin cyst is a fluid-filled sac that forms on a Bartholin gland. Bartholin glands are small glands that are found in the folds of skin (labia) on the sides of the lower opening of the vagina.  This type of cyst causes a bulge on the side of the vagina. A cyst that is not large or infected may not cause problems. However, if the fluid in the cyst becomes infected, the cyst can turn into an abscess. An abscess may cause discomfort or pain.  Follow these instructions at home:  · Take medicines only as told by your doctor.  · If you were prescribed an antibiotic medicine, finish all of it even if you start to feel better.  · Apply warm, wet compresses to the area or take warm, shallow baths that cover your pelvic area (sitz baths). Do this many times each day or as told by your doctor.  · Do not squeeze the cyst. Do not apply heavy pressure to it.  · Do not have sex until the cyst has gone away.  · If your cyst or abscess was opened by your doctor, a small piece of gauze or a drain may have been placed in the area. That lets the cyst drain. Do not remove the gauze or the drain until your doctor tells you it is okay to do that.  · Do not wear tampons. Wear feminine pads as needed for any fluid or blood.  · Keep all follow-up visits as told by your doctor. This is important.  Contact a doctor if:  · Your pain, puffiness (swelling), or redness in the area of the cyst gets worse.  · You have fluid or pus pus coming from the cyst.  · You have a fever.  This information is not intended to replace advice given to you by your health care provider. Make sure you discuss any questions you have with your health care provider.  Document Released: 03/16/2010 Document Revised: 05/25/2017 Document Reviewed: 08/03/2015  © 2017 [employer]vier    ACTIVITY: Rest and take it easy for the first 24 hours.  A responsible adult is recommended to remain with you during that time.  It is normal to  feel sleepy.  We encourage you to not do anything that requires balance, judgment or coordination.    MILD FLU-LIKE SYMPTOMS ARE NORMAL. YOU MAY EXPERIENCE GENERALIZED MUSCLE ACHES, THROAT IRRITATION, HEADACHE AND/OR SOME NAUSEA.    FOR 24 HOURS DO NOT:  Drive, operate machinery or run household appliances.  Drink beer or alcoholic beverages.   Make important decisions or sign legal documents.    SPECIAL INSTRUCTIONS: Word catheter in place; Do not pull    DIET: To avoid nausea, slowly advance diet as tolerated, avoiding spicy or greasy foods for the first day.  Add more substantial food to your diet according to your physician's instructions.  Babies can be fed formula or breast milk as soon as they are hungry.  INCREASE FLUIDS AND FIBER TO AVOID CONSTIPATION.    SURGICAL DRESSING/BATHING: May shower; No tub baths    FOLLOW-UP APPOINTMENT:  A follow-up appointment should be arranged with your doctor call to schedule.    You should CALL YOUR PHYSICIAN if you develop:  Fever greater than 101 degrees F.  Pain not relieved by medication, or persistent nausea or vomiting.  Excessive bleeding (blood soaking through dressing) or unexpected drainage from the wound.  Extreme redness or swelling around the incision site, drainage of pus or foul smelling drainage.  Inability to urinate or empty your bladder within 8 hours.  Problems with breathing or chest pain.    You should call 911 if you develop problems with breathing or chest pain.  If you are unable to contact your doctor or surgical center, you should go to the nearest emergency room or urgent care center.  Physician's telephone #: [phone]    If any questions arise, call your doctor.  If your doctor is not available, please feel free to call the Surgical Center at [phone].  The Center is open Monday through Friday from 7AM to 7PM.  You can also call the [facility] HOTLINE open 24 hours/day, 7 days/week and speak to a nurse at [phone], or toll free at  [phone].    A registered nurse may call you a few days after your surgery to see how you are doing after your procedure.    MEDICATIONS: Resume taking daily medication.  Take prescribed pain medication with food.  If no medication is prescribed, you may take non-aspirin pain medication if needed.  PAIN MEDICATION CAN BE VERY CONSTIPATING.  Take a stool softener or laxative such as senokot, pericolace, or milk of magnesia if needed.    Prescription given for Doxycycline (Monodox); Motrin (Ibuprofen); metroNidazole (Flagyl) .  Last pain medication given at __________.    If your physician has prescribed pain medication that includes Acetaminophen (Tylenol), do not take additional Acetaminophen (Tylenol) while taking the prescribed medication.    Depression / Suicide Risk    As you are discharged from this [facility] facility, it is important to learn how to keep safe from harming yourself.    Recognize the warning signs:  · Abrupt changes in personality, positive or negative- including increase in energy   · Giving away possessions  · Change in eating patterns- significant weight changes-  positive or negative  · Change in sleeping patterns- unable to sleep or sleeping all the time   · Unwillingness or inability to communicate  · Depression  · Unusual sadness, discouragement and loneliness  · Talk of wanting to die  · Neglect of personal appearance   · Rebelliousness- reckless behavior  · Withdrawal from people/activities they love  · Confusion- inability to concentrate     If you or a loved one observes any of these behaviors or has concerns about self-harm, here's what you can do:  · Talk about it- your feelings and reasons for harming yourself  · Remove any means that you might use to hurt yourself (examples: pills, rope, extension cords, firearm)  · Get professional help from the community (Mental Health, Substance Abuse, psychological counseling)  · Do not be alone:Call your Safe Contact- someone whom  you trust who will be there for you.  · Call your local CRISIS HOTLINE 144-5642 or 958-342-0664  · Call your local Children's Mobile Crisis Response Team [place] (516) 974-1012 or www.[employer]  · Call the toll free National Suicide Prevention Hotlines   · National Suicide Prevention Lifeline 550-711-SVCM (3004)  · National [employer] 800-SUICIDE (975-4488)

## 2018-10-27 NOTE — CONSULTS
"GYN Consult    CC: my vagina hurts    HPI: Vidhya Abdullahi is a 23 y.o.  with hx of inadequately treated PID here c/o vaginal pain and abcess.  Pt reports she was seen yesterday in [facility] ED. Reports was told she had abscess that needed drainage which she refused in ED.  Wanted to be put to sleep but they were unable to do this so she went home.    Pt reports her \"vagina hurts\" and has been this way for a few days.  Denies that it has ever felt like this before.  Denies abdominal pain.  Denies drainage from the area, not sure if there was some bleeding or not.  Endorses vaginal discharge, denies odor.  Reports she always has some dysuria, denies recent changes to this.  Denies fever.  Reports had some nausea and vomiting last night, none today.     Hx of meth use, last used 2days ago.        Documentation from [facility] reviewed and showed:  GC/CT performed, no result yet, treated w/ IM ceftriaxone and given rx for doxy/flagyl.  +trich on wet mount   TVUS performed and normal uterus and adnexa.      ROS:  constitutional: denies fevers, general concerns  CV: denies chest pain, palpitations, edema  Resp: denies shortness of breath, cough  GI: denies abd pain, +N/V as above  : +discharge, + dysuria, + vaginal lesion as above  All other systems reviewed and negative    OBHx:     Reports 1 FT , given up for adoption  1 stillbirth - unsure exactly how far along but early  2 SAB      GYN Hx:   Reports she does not have regular menses, cannot remember when her last menses was  Not using contraception  +hx of GC/CT/HSV/trich  No paps    Past Medical History:   Diagnosis Date   • Depression    • Drug abuse (HCC)    • No known health problems    • Ovarian cyst    • Smoker    • UTI (urinary tract infection)    pt denies medical problems    Past Surgical History:   Procedure Laterality Date   • TONSILLECTOMY         Medications:   Taking none    Allergies: Patient has no known allergies.    Social History " "    Social History Main Topics   • Smoking status: Current Every Day Smoker     Packs/day: 1.00     Types: Cigarettes   • Smokeless tobacco: Never Used   • Alcohol use No   • Drug use: Yes     Types: Intravenous      Comment: meth   • Sexual activity: Not on file       History reviewed. No pertinent family history.      Physical Exam:  /80   Pulse 93   Temp 37.2 °C (98.9 °F)   Resp 16   Ht 1.549 m (5' 1\")   Wt 62.4 kg (137 lb 9.1 oz)   LMP 2018 (Approximate)   SpO2 97%   BMI 25.99 kg/m²   gen: pt sleeping on arrival, aroused and communicative  CV: RRR; no LE edema  resp: ctab  abd: soft, NT, ND, no masses, no organomegaly, no hernias  : NEFG with enlarged R labia, erythematous in appearance.  Bulbous lower R labia majora.  Very tender to palpation; mild white frothy dischnarge noted from vagina.  pt unable to tolerate internal exam, palpable fullness in area of Bartholin's gland  Skin: warm/dry, no lesions    Results for BRIDGER RO (MRN [id number]) as of 10/27/2018 01:17   Ref. Range 10/26/2018 21:32   WBC Latest Ref Range: 4.8 - 10.8 K/uL 9.8   RBC Latest Ref Range: 4.20 - 5.40 M/uL 4.47   Hemoglobin Latest Ref Range: 12.0 - 16.0 g/dL 13.7   Hematocrit Latest Ref Range: 37.0 - 47.0 % 39.9   MCV Latest Ref Range: 81.4 - 97.8 fL 89.3   MCH Latest Ref Range: 27.0 - 33.0 pg 30.6   MCHC Latest Ref Range: 33.6 - 35.0 g/dL 34.3   RDW Latest Ref Range: 35.9 - 50.0 fL 38.8   Platelet Count Latest Ref Range: 164 - 446 K/uL 199   MPV Latest Ref Range: 9.0 - 12.9 fL 9.3     A/P: 23 y.o.  w/ suspected R Bartholin's cyst abscess  UA/UDS pending    Inadequately treated PID (reports never has taken outpt abx as prescribed).  Given ceftriaxone and initial doxy in ED yesterday.  Will finish treatment with doxy/flagyl which will also cover for trich which was + yesterday.    Doxy should cover for Bartholin's abscess as well.      Strongly recommend I&D/word catheter placement in ED however pt refuses " without being 'alseep'  Will go to OR for exam under anesthesia, incision and drainage of suspected bartholin's cyst abscess, possible word catheter placement.     Discussed w/ pt importance of compliance with antibiotics to treat this as well as her PID.  Also discussed word catheter and that should remain in place (although sometimes fall out), will need to be seen in 2wks for f/u in the office.      I discussed w/ pt risks of surgery including pain, bleeding, infection.  Discussed risk of recurrence with Bartholin's cyst and possible need for repeat procedure.  Pt confirms she is accepting of blood transfusion in case of emergency.  All questions answered.  Consent signed   Pt requests after the surgery that I call her boyfriend Al at [phone]      Jahaira Butterfield MD  [facility], Women's Health

## 2018-10-27 NOTE — ED NOTES
"Entered room to medicate patient - patient screaming loudly and crying stating \"OW IT HURTS IT HURTS OWIE OWIE I CAN FEEL IT GETTING BIGGER IT HURTS OWIE.\" Medicated per orders for 10/10 pain. Patient noted by this RN to have stopped yelling and to be laying calmly on gurney prior to medication being administered. Informed of need for urine specimen per orders - states that she does not need to urinate at this time. Call bell within reach. Significant other no longer at bedside.  "

## 2018-10-27 NOTE — ED NOTES
Patient sleeping on gurney. No acute distress. Active chest rise and fall noted. No behavioral pain indicators noted. IV antibiotics initiated per orders. Call bell within reach.

## 2018-10-27 NOTE — PROGRESS NOTES
Patients SO at bedside, dressed with minimal assistance. SO verbalized understanding of all written and verbal discharge orders provided, no further questions. Patient ambulated to restroom, voided and then ambulated to wheelchair. Patient was taken via wheelchair to car, seat belted in front seat passenger side and DC'd home.

## 2018-10-27 NOTE — OR NURSING
Patient aroused by verbal with tactile stimuli; sip of H2O given.  Patient returned to sleep quickly

## 2018-10-27 NOTE — OP REPORT
Operative Report  Date of Service: 10/27/18      PreOp Diagnosis:   1. Suspected bartholin's gland cyst abscess     PostOp Diagnosis:   Right bartholin's gland cyst abscess     Procedure(s):  EXAM UNDER ANESTHESIA - Wound Class: Dirty or Infected  SKIN ABSCESS INCISION AND DRAINAGE AND WORD CATHETER PLACEMENT - Wound Class: Dirty or Infected     Surgeon(s):  Jahaira Butterfield M.D.     Anesthesiologist/Type of Anesthesia:  Anesthesiologist: Landon Forrest M.D./General     Surgical Staff:  Circulator: Jt Trejo R.NRashawn; Vidhya Singer RCRISTY  Scrub Person: Alexandra Aguilar     Specimens removed if any:  Wound culture sent     Estimated Blood Loss: 2cc  IVF: 500cc     Findings: R bartholin's cyst abscess approximately 5x4cm productive of copious purulent drainage when incised.    EUA: uterus small, anteverted, no adnexal masses       Complications: none    Procedure in Detail:   Patient was taken to the operating room where LMA anesthesia was initiated.  She was prepped and draped in normal sterile fashion in dorsal lithotomy position in [facility].  An exam under anesthesia was performed revealing the above findings.  At the level of the hymen on the inferiomedial aspect, the abscess was incised with a 11 blade, a culture was collected and the abscess was drained.  A hemostat was then used to explore the abscess and there were no loculations noted.  The abscess was irrigated with normal saline.  A Word catheter was placed inside the incision and the balloon inflated with 5 cc of normal saline.  The tail of the port catheter was tucked inside the vagina.  The patient was awakened from anesthesia and taken to recovery in stable condition.  All counts were correct. There were no complications.     Jahaira Butterfield MD  [facility], Women's Health

## 2018-10-27 NOTE — ED TRIAGE NOTES
Pt ambulatory to triage slowly, c/o severe pain to lower abd x 5 days, worse tonight. Pt reports hx of ovarian cysts. UNk last menstrual period, denies pregnancy or vaginal bleeding. Pt yelling and cursing in triage. Pt to senior lounge. Charge RN aware of pt.

## 2018-10-27 NOTE — OR NURSING
Patient aroused to verbal with tactile stimuli; aroused startled and returned to sleep quickly.  Boyfriend at bedside

## 2018-10-27 NOTE — ED NOTES
Charge RN: Pt in senior [facility]. NAD. Pt appears to be speaking with male . Pt seen laying on chair. Resp even and unlabored.

## 2018-10-27 NOTE — OR SURGEON
Immediate Post OP Note    PreOp Diagnosis:   1. Suspected bartholin's gland cyst abscess    PostOp Diagnosis:   Right bartholin's gland cyst abscess    Procedure(s):  EXAM UNDER ANESTHESIA - Wound Class: Dirty or Infected  SKIN ABSCESS INCISION AND DRAINAGE AND WORD CATHETER PLACEMENT - Wound Class: Dirty or Infected    Surgeon(s):  Jahaira Butterfield M.D.    Anesthesiologist/Type of Anesthesia:  Anesthesiologist: Landon Forrest M.D./General    Surgical Staff:  Circulator: Jt Trejo, R.NRashawn; Vidhya Singer RRashawnNRashawn  Scrub Person: Alexandra Aguilar    Specimens removed if any:  Wound culture sent    Estimated Blood Loss: 2    Findings: R bartholin's cyst abscess approximately 5x4cm productive of copious purulent drainage when incised.    EUA: uterus small, anteverted, no adnexal masses    Complications: none        10/27/2018 3:50 AM Jahaira Butterfield M.D.

## 2018-10-27 NOTE — ED NOTES
Patient ambulatory from Senior Lounge to Red 9 with steady gait accompanied by significant other and ED RN. Chart up for ERP.

## 2018-10-27 NOTE — ED NOTES
Genital exam performed at bedside by Dr. Brooks chaperoned by this RN. Patient tolerated poorly, screaming and writhing in pain. Unable to complete full examination due to patient's intolerance due to pain.

## 2018-10-27 NOTE — PROGRESS NOTES
Care assumed from off going RN, all discharge paperwork done with patient boyfriend. Patient easily awakens,  Taking po without n/v, denies pain. Awaiting boyfriend to return for discharge.

## 2018-10-27 NOTE — ED NOTES
"Pateint complaining of 8/10 abdominal pain - states \"this feels like my ovarian cysts I've had before but worse.\" Denies nausea. Complaining of dysuria. Hx. UTI - states \"this could be a UTI.\"    Patient attached to vitals monitor. IV access placed and blood drawn - patient tolerated well with no complaints of pain discomfort.    During interaction patient resting calmly on gurney. Joking and laughing with significant other. No behavioral pain indicators noted. Requiring multiple verbal prompts to answer questions from staff.    Warm blanket provided per patient request. Significant other remains at bedside. Call bell within reach.  "

## 2018-10-27 NOTE — OR NURSING
0245: Patient screaming and crying during pre-op interview regarding pain in vaginal area, patient reported pain 10/10.  Notified Dr. Forrest, Morphine IV ordered for pain.  Discussed with patient and administered per order.  Patient reported immediate pain relief and resting calmly in bed following medication administration.    0325:  Word catheter placed to right labia by Dr. Butterfield following Bartholin cyst abscess I&D.  Recovery RN notified.

## 2018-10-27 NOTE — ED NOTES
Pt ambulated to the bathroom screaming in pain, she voided 300ml dark randell cloudy urine, specimen to lab

## 2018-10-27 NOTE — ED PROVIDER NOTES
ED Provider Note    Scribed for Rosa Elena Brooks M.D. by Juanita Patel. 10/26/2018  9:39 PM    Means of arrival: walk-in  History obtained from: patient  History limited by: none      CHIEF COMPLAINT  Chief Complaint   Patient presents with   • Abdominal Pain       HPI  Vidhya Abdullahi is a 23 y.o. female with past medical history of ovarian cysts, PID, methamphetamine abuse who presents to the Emergency Department for abdominal and vaginal pain onset 5 days ago.  She describes sharp, severe pain mainly to the vaginal area. She states that she was diagnosed with a vaginal abscess last night at [facility].  She was unwilling to have them performed incision and drainage at that time.  She was discharged home with antibiotics which she was unable to fill due to cost.  She reports associated dysuria, nausea, and vomiting in addition to brown vaginal discharge.  Patient reports that one week ago she felt hot and had a fever of 99, however, has no current fever. She denies any chance of pregnancy. Patient reports being otherwise healthy.    REVIEW OF SYSTEMS  Pertinent positive include dysuria, nausea, vomiting, abdominal pain. Pertinent negative include chance of pregnancy or fever. All other systems reviewed and are negative.    PAST MEDICAL HISTORY   has a past medical history of Depression; Drug abuse (HCC); No known health problems; Ovarian cyst; Smoker; and UTI (urinary tract infection).    SOCIAL HISTORY  Social History     Social History Main Topics   • Smoking status: Current Every Day Smoker     Packs/day: 1.00     Types: Cigarettes   • Smokeless tobacco: Never Used   • Alcohol use No   • Drug use: No      Comment: iv meth, former heroin use, denies currently       SURGICAL HISTORY   has a past surgical history that includes tonsillectomy.    CURRENT MEDICATIONS  Home Medications     Reviewed by Rosalind Morgan R.N. (Registered Nurse) on 10/26/18 at 6026  Med List Status: Partial   Medication Last Dose Status     "    Patient Sahil Taking any Medications                       ALLERGIES  No Known Allergies    PHYSICAL EXAM   VITAL SIGNS: /80   Pulse (!) 134   Temp 37.2 °C (98.9 °F)   Resp 19   Ht 1.549 m (5' 1\")   Wt 62.4 kg (137 lb 9.1 oz)   LMP 09/30/2018 (Approximate)   SpO2 92%   BMI 25.99 kg/m²    Constitutional: Appears distressed and intermittently screaming in pain.  HENT: Normocephalic, Atraumatic. Bilateral external ears normal. Nose normal.  Moist mucous membranes.  Oropharynx clear.  Eyes: Pupils are equal and reactive. Conjunctiva normal.   Neck: Supple, full range of motion  Heart: Tachycardic.  Regular rhythm.  No murmurs.    Lungs: No respiratory distress, normal work of breathing. Lungs clear to auscultation bilaterally.  Abdomen Soft, no distention.  No tenderness to palpation.  : Large 3 x 3 cm Bartholin cyst to the inner right labia. White vaginal discharge in vaginal vault. Patient unable to tolerate speculum or bimanual exam.  Musculoskeletal: Atraumatic. No obvious deformities noted.  No lower extremity edema.  Skin: Warm, Dry.  No erythema, No rash.   Neurologic: Alert and oriented x3. Moving all extremities spontaneously without focal deficits.  Psychiatric: Affect normal, Mood normal, Appears appropriate and not intoxicated.      DIAGNOSTIC STUDIES    LABS  Personally reviewed by me  Labs Reviewed   BASIC METABOLIC PANEL - Abnormal; Notable for the following:        Result Value    Glucose 100 (*)     All other components within normal limits   CBC WITH DIFFERENTIAL   BLOOD CULTURE,HOLD   ESTIMATED GFR   BLOOD CULTURE    Narrative:     Per Hospital Policy: Only change Specimen Src: to \"Line\" if  specified by physician order.   URINE DRUG SCREEN   HIV AG/AB COMBO ASSAY DIAGNOSTIC   T.PALLIDUM AB EIA   URINALYSIS,CULTURE IF INDICATED   REFRACTOMETER SG   HCG QUAL SERUM     ED COURSE  Vitals:    10/27/18 0030 10/27/18 0100 10/27/18 0130 10/27/18 0202   BP:       Pulse: 62 92 96    Resp: " "(!) 10 17 15    Temp:    37.1 °C (98.7 °F)   SpO2: 100% 97% 97% 97%   Weight:       Height:         Medications administered:  Medications   morphine (pf) 10 mg/ml 10 MG/ML injection 6 mg (6 mg Intravenous Given 10/26/18 2152)   ondansetron (ZOFRAN) syringe/vial injection 4 mg (4 mg Intravenous Given 10/26/18 2152)   clindamycin (CLEOCIN) IVPB premix 600 mg (0 mg Intravenous Stopped 10/27/18 0004)       Old records personally reviewed:  Patient seen at the end of September in ED for PID. Patient was seen at [facility] ED yesterday and she had a transabdominal pelvic US showing a normal uterus. Unable to visualize ovaries. No adnexal mass. Wet prep was positive for white blood cells and Trichomonas. She was treated with Doxycyline and Rocephin and was discharged home on Flagyl, Doxycycline, and Acyclovir.    9:39 PM Patient seen and examined at bedside. The patient presents with abdominal pain. Ordered for CBC, BMP, UA culture, HCG qual to evaluate. Patient will be treated with morphine injection 6 mg and Zofran injection 4 mg for her symptoms. I informed the patient of plan of care and she was agreeable at this time.    MEDICAL DECISION MAKING  Patient with history of methamphetamine abuse and multiple prior episodes of PID in addition to ovarian cysts who presents with a Bartholin's abscess.  She is afebrile, tachycardic on arrival with otherwise normal vital signs.  Tachycardia is likely due to pain and agitation.  Her abdominal exam is overall benign without concern for ovarian torsion, TOA.  Reviewed pelvic ultrasound from yesterday evening without significant abnormalities.  Labs overall reassuring without evidence of leukocytosis or concern for sepsis.  I had a long discussion with the patient regarding the Bartholin's abscess and the need for incision and drainage.  She is refusing any intervention unless she is \"put to sleep.\"  Trying to examine the patient is extremely difficult due to her noncompliance " even after pain medications.  I will therefore plan to call OB/GYN to see if drainage could be done in the operating room as I do not feel she is safe for incision and drainage here in the emergency department or conscious sedation.    12:07 AM -  I discussed the patient's case and the above findings with Dr. Khalil (OB/GYN) who agreed to evaluate the patient.    12:35 AM - Dr. Khalil is at bedside evaluating the patient.  Plan to take to the operating room for drainage of the Bartholin's abscess followed by likely discharge home.  I will defer antibiotic treatment for recurrent PID and Bartholin's abscess to Dr. Khalil upon discharge.      IMPRESSION  Bartholin's abscess  Methamphetamine abuse     DISPOSITION - transfer to the OR for procedure followed by discharge    Results, diagnoses, and treatment options were discussed with the patient and/or family. Patient verbalized understanding of plan of care.    There are no discharge medications for this patient.     Juanita YATES (Dirk), am scribing for, and in the presence of, Rosa Elena Brooks M.D..    Electronically signed by: Juanita Patel (Dirk), 10/26/2018    Rosa Elena YATES M.D. personally performed the services described in this documentation, as scribed by Juanita Patel in my presence, and it is both accurate and complete. C.    The note accurately reflects work and decisions made by me.  Rosa Elena Brooks  10/27/2018  2:19 AM

## 2018-10-30 LAB
BACTERIA WND AEROBE CULT: ABNORMAL
GRAM STN SPEC: ABNORMAL
SIGNIFICANT IND 70042: ABNORMAL
SITE SITE: ABNORMAL
SOURCE SOURCE: ABNORMAL

## 2018-10-31 LAB
BACTERIA SPEC ANAEROBE CULT: ABNORMAL
BACTERIA SPEC ANAEROBE CULT: ABNORMAL
SIGNIFICANT IND 70042: ABNORMAL
SITE SITE: ABNORMAL
SOURCE SOURCE: ABNORMAL

## 2018-11-01 ENCOUNTER — TELEPHONE (OUTPATIENT)
Dept: OBGYN | Facility: MEDICAL CENTER | Age: 23
End: 2018-11-01

## 2018-11-01 LAB
BACTERIA BLD CULT: NORMAL
SIGNIFICANT IND 70042: NORMAL
SITE SITE: NORMAL
SOURCE SOURCE: NORMAL

## 2018-11-01 NOTE — TELEPHONE ENCOUNTER
Called pt to discuss +RPR/antitreponemal testing    Pt reports she was treated for syphilis recently, thinks she received 2 injections for syphilis in the ED at [facility].    Pt reports she is taking her PO antibiotics now.   Call transferred to front office to schedule f/u with me.      On review of prior ED notes, seems to have been given IM PCN 9/30/18 for possible exposure to syphilis, cannot find documentation of additional dosing. This would have been adequate treatment for early syphilis on review of CDC guidelines although no prior RPR/antitreponemal testing seen in chart.     Spoke with [facility] department ([facility]), if with history of symptoms in the past 1 yr or if neg testing within the past 1yr.  Will submit STI reporting form to them as well, should hear back from them regarding further action needed.     Jahaira Butterfield MD  [facility], Women's Health

## 2018-11-08 ENCOUNTER — GYNECOLOGY VISIT (OUTPATIENT)
Dept: OBGYN | Facility: MEDICAL CENTER | Age: 23
End: 2018-11-08
Payer: COMMERCIAL

## 2018-11-08 VITALS
BODY MASS INDEX: 24.66 KG/M2 | WEIGHT: 134 LBS | HEIGHT: 62 IN | SYSTOLIC BLOOD PRESSURE: 115 MMHG | DIASTOLIC BLOOD PRESSURE: 80 MMHG

## 2018-11-08 DIAGNOSIS — Z09 POSTOP CHECK: Primary | ICD-10-CM

## 2018-11-08 DIAGNOSIS — Z30.011 ENCOUNTER FOR INITIAL PRESCRIPTION OF CONTRACEPTIVE PILLS: ICD-10-CM

## 2018-11-08 DIAGNOSIS — A53.9 SYPHILIS: ICD-10-CM

## 2018-11-08 RX ORDER — LEVONORGESTREL AND ETHINYL ESTRADIOL 0.1-0.02MG
1 KIT ORAL DAILY
Qty: 84 TAB | Refills: 4 | Status: SHIPPED | OUTPATIENT
Start: 2018-11-08 | End: 2019-02-01

## 2018-11-08 NOTE — PROGRESS NOTES
"Gyn Visit    CC: ED f/u, postop check    HPI:  22yo G0 here for f/u after ED visit for bartholin's cyst abscess 10/27/18 where she was taken to the OR for abscess I&D and word catheter placement (unable to tolerate exam in the ED, refused exam/procedure without sedation/anesthesia).  Pt with hx of methamphetamine use, also s/p recent treatment for exposure to syphilis 9/30/18 at ED w/ IM PCN x1.  Also treated for PID (had several imcomplete attempts at treatment in the past 1 month), discharged w/ flagyl/doxy x2wks.  Pt did present to OSH 1 day after discharge with pain, word catheter was in place on exam there, however replaced with less saline in balloon.    Pt reports she is doing better, no fevers. Reports she has been taking her abx as prescribed and is almost done with them (3 more days).  Reports [facility] contacted her and informed her difficult to know if she was adequately treated (for primary syphilis) since no labwork done prior to treatment and + RPR (1:32) after treatment.  Per pt, recommended repeat of titer.    Endorses has been using methamphetamine since discharge.    Not on contraception currently.    /80 (BP Location: Left arm)   Ht 1.575 m (5' 2\")   Wt 60.8 kg (134 lb)   BMI 24.51 kg/m²   Gen: AAO, NAD  Abd: soft, NT, ND  : NEFG, word catheter noted to be freely floating in vagina and removed/discarded.  Normal vagina/cervix.  Uterus small, AV, nontender.    Wound cx positive for prevotella loescheii    A/P: 22yo w/ hx of methamphetamine, PID, syphilis, Bartholin cyst abscess  -Word catheter out, abscess currently resolved.  Instructed to complete antibiotics both for abscess and PID  -Patient confirmed she received information about rehabilitation from drug use at the ED.  Declines additional information today.  Aware that she needs to get clean, is not sure if she is interested in doing so at this time  -Advised long-acting contraception, especially Nexplanon.  " Patient declines all contraception except for birth control pills which she desires prescription for today.  Reviewed increased risk of blood clots for all women taking estrogen.  Emphasized importance of taking her to make this into every day and discussed failure rates of up to 10%.    -[place]  Plan for repeat RPR to ensure adequate treatment, patient aware of need for this.  Prescription given today.    To establish with a primary GYN for routine care; insurance not accepted at our office but we could see her routinely at [facility].    Jahaira Butterfield MD  [facility], Women's Health

## 2018-11-08 NOTE — PROGRESS NOTES
Pt presents as NEW GYN visit ER [facility]. Pt was seen through ER and had Word placed. Still both vanc and flagyl. Denies fevers. States all partners have been tx for Syphillis. Would like catheter removed today.

## 2018-11-21 LAB
FUNGUS SPEC CULT: NORMAL
SIGNIFICANT IND 70042: NORMAL
SITE SITE: NORMAL
SOURCE SOURCE: NORMAL

## 2019-02-01 ENCOUNTER — HOSPITAL ENCOUNTER (INPATIENT)
Facility: MEDICAL CENTER | Age: 24
LOS: 2 days | DRG: 872 | End: 2019-02-04
Attending: EMERGENCY MEDICINE | Admitting: HOSPITALIST
Payer: COMMERCIAL

## 2019-02-01 ENCOUNTER — APPOINTMENT (OUTPATIENT)
Dept: RADIOLOGY | Facility: MEDICAL CENTER | Age: 24
DRG: 872 | End: 2019-02-01
Attending: EMERGENCY MEDICINE
Payer: COMMERCIAL

## 2019-02-01 DIAGNOSIS — N12 PYELONEPHRITIS: ICD-10-CM

## 2019-02-01 DIAGNOSIS — R10.9 FLANK PAIN: ICD-10-CM

## 2019-02-01 DIAGNOSIS — A41.9 SEPSIS, DUE TO UNSPECIFIED ORGANISM: ICD-10-CM

## 2019-02-01 LAB
ALBUMIN SERPL BCP-MCNC: 3.9 G/DL (ref 3.2–4.9)
ALBUMIN/GLOB SERPL: 1.4 G/DL
ALP SERPL-CCNC: 55 U/L (ref 30–99)
ALT SERPL-CCNC: 11 U/L (ref 2–50)
ANION GAP SERPL CALC-SCNC: 10 MMOL/L (ref 0–11.9)
APPEARANCE UR: CLEAR
AST SERPL-CCNC: 13 U/L (ref 12–45)
BACTERIA #/AREA URNS HPF: ABNORMAL /HPF
BASOPHILS # BLD AUTO: 0.4 % (ref 0–1.8)
BASOPHILS # BLD: 0.07 K/UL (ref 0–0.12)
BILIRUB SERPL-MCNC: 0.7 MG/DL (ref 0.1–1.5)
BILIRUB UR QL STRIP.AUTO: NEGATIVE
BUN SERPL-MCNC: 10 MG/DL (ref 8–22)
CALCIUM SERPL-MCNC: 8.4 MG/DL (ref 8.5–10.5)
CHLORIDE SERPL-SCNC: 105 MMOL/L (ref 96–112)
CO2 SERPL-SCNC: 20 MMOL/L (ref 20–33)
COLOR UR: YELLOW
CREAT SERPL-MCNC: 0.73 MG/DL (ref 0.5–1.4)
EOSINOPHIL # BLD AUTO: 0.07 K/UL (ref 0–0.51)
EOSINOPHIL NFR BLD: 0.4 % (ref 0–6.9)
EPI CELLS #/AREA URNS HPF: NEGATIVE /HPF
ERYTHROCYTE [DISTWIDTH] IN BLOOD BY AUTOMATED COUNT: 41.3 FL (ref 35.9–50)
GLOBULIN SER CALC-MCNC: 2.8 G/DL (ref 1.9–3.5)
GLUCOSE SERPL-MCNC: 91 MG/DL (ref 65–99)
GLUCOSE UR STRIP.AUTO-MCNC: NEGATIVE MG/DL
HCG UR QL: NEGATIVE
HCT VFR BLD AUTO: 39.3 % (ref 37–47)
HGB BLD-MCNC: 13.3 G/DL (ref 12–16)
HYALINE CASTS #/AREA URNS LPF: ABNORMAL /LPF
IMM GRANULOCYTES # BLD AUTO: 0.05 K/UL (ref 0–0.11)
IMM GRANULOCYTES NFR BLD AUTO: 0.3 % (ref 0–0.9)
KETONES UR STRIP.AUTO-MCNC: 15 MG/DL
LACTATE BLD-SCNC: 1.3 MMOL/L (ref 0.5–2)
LEUKOCYTE ESTERASE UR QL STRIP.AUTO: ABNORMAL
LYMPHOCYTES # BLD AUTO: 2.32 K/UL (ref 1–4.8)
LYMPHOCYTES NFR BLD: 14.4 % (ref 22–41)
MCH RBC QN AUTO: 31.3 PG (ref 27–33)
MCHC RBC AUTO-ENTMCNC: 33.8 G/DL (ref 33.6–35)
MCV RBC AUTO: 92.5 FL (ref 81.4–97.8)
MICRO URNS: ABNORMAL
MONOCYTES # BLD AUTO: 1.06 K/UL (ref 0–0.85)
MONOCYTES NFR BLD AUTO: 6.6 % (ref 0–13.4)
NEUTROPHILS # BLD AUTO: 12.57 K/UL (ref 2–7.15)
NEUTROPHILS NFR BLD: 77.9 % (ref 44–72)
NITRITE UR QL STRIP.AUTO: POSITIVE
NRBC # BLD AUTO: 0 K/UL
NRBC BLD-RTO: 0 /100 WBC
PH UR STRIP.AUTO: 7 [PH]
PLATELET # BLD AUTO: 189 K/UL (ref 164–446)
PMV BLD AUTO: 9.7 FL (ref 9–12.9)
POTASSIUM SERPL-SCNC: 3.7 MMOL/L (ref 3.6–5.5)
PROT SERPL-MCNC: 6.7 G/DL (ref 6–8.2)
PROT UR QL STRIP: NEGATIVE MG/DL
RBC # BLD AUTO: 4.25 M/UL (ref 4.2–5.4)
RBC # URNS HPF: ABNORMAL /HPF
RBC UR QL AUTO: ABNORMAL
SODIUM SERPL-SCNC: 135 MMOL/L (ref 135–145)
SP GR UR REFRACTOMETRY: 1.01
SP GR UR STRIP.AUTO: 1.01
UROBILINOGEN UR STRIP.AUTO-MCNC: 0.2 MG/DL
WBC # BLD AUTO: 16.1 K/UL (ref 4.8–10.8)
WBC #/AREA URNS HPF: ABNORMAL /HPF

## 2019-02-01 PROCEDURE — 81001 URINALYSIS AUTO W/SCOPE: CPT

## 2019-02-01 PROCEDURE — 700105 HCHG RX REV CODE 258: Performed by: EMERGENCY MEDICINE

## 2019-02-01 PROCEDURE — 96365 THER/PROPH/DIAG IV INF INIT: CPT

## 2019-02-01 PROCEDURE — 99285 EMERGENCY DEPT VISIT HI MDM: CPT

## 2019-02-01 PROCEDURE — 700111 HCHG RX REV CODE 636 W/ 250 OVERRIDE (IP): Performed by: EMERGENCY MEDICINE

## 2019-02-01 PROCEDURE — 87077 CULTURE AEROBIC IDENTIFY: CPT | Mod: 91

## 2019-02-01 PROCEDURE — 87086 URINE CULTURE/COLONY COUNT: CPT

## 2019-02-01 PROCEDURE — 87186 SC STD MICRODIL/AGAR DIL: CPT | Mod: 91

## 2019-02-01 PROCEDURE — 80053 COMPREHEN METABOLIC PANEL: CPT

## 2019-02-01 PROCEDURE — 700101 HCHG RX REV CODE 250: Performed by: EMERGENCY MEDICINE

## 2019-02-01 PROCEDURE — 85025 COMPLETE CBC W/AUTO DIFF WBC: CPT

## 2019-02-01 PROCEDURE — 87040 BLOOD CULTURE FOR BACTERIA: CPT | Mod: 91

## 2019-02-01 PROCEDURE — 83605 ASSAY OF LACTIC ACID: CPT

## 2019-02-01 PROCEDURE — 74176 CT ABD & PELVIS W/O CONTRAST: CPT

## 2019-02-01 PROCEDURE — 96375 TX/PRO/DX INJ NEW DRUG ADDON: CPT

## 2019-02-01 PROCEDURE — 81025 URINE PREGNANCY TEST: CPT

## 2019-02-01 RX ORDER — SODIUM CHLORIDE 9 MG/ML
30 INJECTION, SOLUTION INTRAVENOUS
Status: COMPLETED | OUTPATIENT
Start: 2019-02-01 | End: 2019-02-01

## 2019-02-01 RX ORDER — KETOROLAC TROMETHAMINE 30 MG/ML
30 INJECTION, SOLUTION INTRAMUSCULAR; INTRAVENOUS ONCE
Status: COMPLETED | OUTPATIENT
Start: 2019-02-01 | End: 2019-02-01

## 2019-02-01 RX ORDER — SODIUM CHLORIDE 9 MG/ML
1000 INJECTION, SOLUTION INTRAVENOUS ONCE
Status: COMPLETED | OUTPATIENT
Start: 2019-02-01 | End: 2019-02-01

## 2019-02-01 RX ADMIN — SODIUM CHLORIDE 1000 ML: 9 INJECTION, SOLUTION INTRAVENOUS at 18:57

## 2019-02-01 RX ADMIN — SODIUM CHLORIDE 1839 ML: 9 INJECTION, SOLUTION INTRAVENOUS at 21:46

## 2019-02-01 RX ADMIN — KETOROLAC TROMETHAMINE 30 MG: 30 INJECTION, SOLUTION INTRAMUSCULAR at 23:14

## 2019-02-01 RX ADMIN — KETAMINE HYDROCHLORIDE 25 MG: 10 INJECTION, SOLUTION INTRAMUSCULAR; INTRAVENOUS at 19:14

## 2019-02-01 RX ADMIN — CEFTRIAXONE SODIUM 2 G: 2 INJECTION, POWDER, FOR SOLUTION INTRAMUSCULAR; INTRAVENOUS at 21:45

## 2019-02-02 ENCOUNTER — APPOINTMENT (OUTPATIENT)
Dept: RADIOLOGY | Facility: MEDICAL CENTER | Age: 24
DRG: 872 | End: 2019-02-02
Attending: INTERNAL MEDICINE
Payer: COMMERCIAL

## 2019-02-02 PROBLEM — F15.10 METHAMPHETAMINE ABUSE (HCC): Status: ACTIVE | Noted: 2019-02-02

## 2019-02-02 PROBLEM — N12 PYELONEPHRITIS: Status: ACTIVE | Noted: 2019-02-02

## 2019-02-02 PROBLEM — A41.9 SEPSIS (HCC): Status: ACTIVE | Noted: 2019-02-02

## 2019-02-02 LAB
AMPHET UR QL SCN: POSITIVE
ANION GAP SERPL CALC-SCNC: 7 MMOL/L (ref 0–11.9)
BARBITURATES UR QL SCN: NEGATIVE
BASOPHILS # BLD AUTO: 0.5 % (ref 0–1.8)
BASOPHILS # BLD: 0.05 K/UL (ref 0–0.12)
BENZODIAZ UR QL SCN: NEGATIVE
BUN SERPL-MCNC: 8 MG/DL (ref 8–22)
BZE UR QL SCN: NEGATIVE
CALCIUM SERPL-MCNC: 7.6 MG/DL (ref 8.5–10.5)
CANNABINOIDS UR QL SCN: NEGATIVE
CHLORIDE SERPL-SCNC: 111 MMOL/L (ref 96–112)
CO2 SERPL-SCNC: 18 MMOL/L (ref 20–33)
CREAT SERPL-MCNC: 0.63 MG/DL (ref 0.5–1.4)
D DIMER PPP IA.FEU-MCNC: <0.4 UG/ML (FEU) (ref 0–0.5)
EKG IMPRESSION: NORMAL
EKG IMPRESSION: NORMAL
EOSINOPHIL # BLD AUTO: 0.09 K/UL (ref 0–0.51)
EOSINOPHIL NFR BLD: 0.9 % (ref 0–6.9)
ERYTHROCYTE [DISTWIDTH] IN BLOOD BY AUTOMATED COUNT: 45.1 FL (ref 35.9–50)
GLUCOSE SERPL-MCNC: 103 MG/DL (ref 65–99)
HCT VFR BLD AUTO: 35.9 % (ref 37–47)
HGB BLD-MCNC: 11.6 G/DL (ref 12–16)
IMM GRANULOCYTES # BLD AUTO: 0.02 K/UL (ref 0–0.11)
IMM GRANULOCYTES NFR BLD AUTO: 0.2 % (ref 0–0.9)
LACTATE BLD-SCNC: 1.4 MMOL/L (ref 0.5–2)
LACTATE BLD-SCNC: 1.6 MMOL/L (ref 0.5–2)
LYMPHOCYTES # BLD AUTO: 2.31 K/UL (ref 1–4.8)
LYMPHOCYTES NFR BLD: 22.7 % (ref 22–41)
MCH RBC QN AUTO: 31.5 PG (ref 27–33)
MCHC RBC AUTO-ENTMCNC: 32.3 G/DL (ref 33.6–35)
MCV RBC AUTO: 97.6 FL (ref 81.4–97.8)
METHADONE UR QL SCN: NEGATIVE
MONOCYTES # BLD AUTO: 0.89 K/UL (ref 0–0.85)
MONOCYTES NFR BLD AUTO: 8.7 % (ref 0–13.4)
NEUTROPHILS # BLD AUTO: 6.83 K/UL (ref 2–7.15)
NEUTROPHILS NFR BLD: 67 % (ref 44–72)
NRBC # BLD AUTO: 0 K/UL
NRBC BLD-RTO: 0 /100 WBC
OPIATES UR QL SCN: NEGATIVE
OXYCODONE UR QL SCN: POSITIVE
PCP UR QL SCN: NEGATIVE
PLATELET # BLD AUTO: 109 K/UL (ref 164–446)
PMV BLD AUTO: 9.5 FL (ref 9–12.9)
POTASSIUM SERPL-SCNC: 3.9 MMOL/L (ref 3.6–5.5)
PROPOXYPH UR QL SCN: NEGATIVE
RBC # BLD AUTO: 3.68 M/UL (ref 4.2–5.4)
SODIUM SERPL-SCNC: 136 MMOL/L (ref 135–145)
TROPONIN I SERPL-MCNC: 0.12 NG/ML (ref 0–0.04)
TROPONIN I SERPL-MCNC: 0.15 NG/ML (ref 0–0.04)
WBC # BLD AUTO: 10.2 K/UL (ref 4.8–10.8)

## 2019-02-02 PROCEDURE — 93005 ELECTROCARDIOGRAM TRACING: CPT | Performed by: INTERNAL MEDICINE

## 2019-02-02 PROCEDURE — 700102 HCHG RX REV CODE 250 W/ 637 OVERRIDE(OP): Performed by: HOSPITALIST

## 2019-02-02 PROCEDURE — 71045 X-RAY EXAM CHEST 1 VIEW: CPT

## 2019-02-02 PROCEDURE — 36415 COLL VENOUS BLD VENIPUNCTURE: CPT

## 2019-02-02 PROCEDURE — 85379 FIBRIN DEGRADATION QUANT: CPT

## 2019-02-02 PROCEDURE — A9270 NON-COVERED ITEM OR SERVICE: HCPCS | Performed by: HOSPITALIST

## 2019-02-02 PROCEDURE — 700105 HCHG RX REV CODE 258: Performed by: HOSPITALIST

## 2019-02-02 PROCEDURE — 83605 ASSAY OF LACTIC ACID: CPT | Mod: 91

## 2019-02-02 PROCEDURE — 80048 BASIC METABOLIC PNL TOTAL CA: CPT

## 2019-02-02 PROCEDURE — 84484 ASSAY OF TROPONIN QUANT: CPT

## 2019-02-02 PROCEDURE — 700111 HCHG RX REV CODE 636 W/ 250 OVERRIDE (IP): Performed by: HOSPITALIST

## 2019-02-02 PROCEDURE — 700105 HCHG RX REV CODE 258: Performed by: INTERNAL MEDICINE

## 2019-02-02 PROCEDURE — 99222 1ST HOSP IP/OBS MODERATE 55: CPT | Mod: AI | Performed by: HOSPITALIST

## 2019-02-02 PROCEDURE — 770020 HCHG ROOM/CARE - TELE (206)

## 2019-02-02 PROCEDURE — 80307 DRUG TEST PRSMV CHEM ANLYZR: CPT

## 2019-02-02 PROCEDURE — 93010 ELECTROCARDIOGRAM REPORT: CPT | Performed by: INTERNAL MEDICINE

## 2019-02-02 PROCEDURE — 85025 COMPLETE CBC W/AUTO DIFF WBC: CPT

## 2019-02-02 RX ORDER — ASPIRIN 325 MG
325 TABLET ORAL DAILY
Status: DISCONTINUED | OUTPATIENT
Start: 2019-02-03 | End: 2019-02-02

## 2019-02-02 RX ORDER — ASPIRIN 325 MG
325 TABLET ORAL ONCE
Status: ACTIVE | OUTPATIENT
Start: 2019-02-02 | End: 2019-02-03

## 2019-02-02 RX ORDER — OXYCODONE HYDROCHLORIDE 5 MG/1
2.5 TABLET ORAL
Status: DISCONTINUED | OUTPATIENT
Start: 2019-02-02 | End: 2019-02-04 | Stop reason: HOSPADM

## 2019-02-02 RX ORDER — LORAZEPAM 2 MG/ML
0.5 INJECTION INTRAMUSCULAR ONCE
Status: ACTIVE | OUTPATIENT
Start: 2019-02-02 | End: 2019-02-03

## 2019-02-02 RX ORDER — PROMETHAZINE HYDROCHLORIDE 25 MG/1
12.5-25 TABLET ORAL EVERY 4 HOURS PRN
Status: DISCONTINUED | OUTPATIENT
Start: 2019-02-02 | End: 2019-02-04 | Stop reason: HOSPADM

## 2019-02-02 RX ORDER — PROMETHAZINE HYDROCHLORIDE 25 MG/1
12.5-25 SUPPOSITORY RECTAL EVERY 4 HOURS PRN
Status: DISCONTINUED | OUTPATIENT
Start: 2019-02-02 | End: 2019-02-04 | Stop reason: HOSPADM

## 2019-02-02 RX ORDER — ACETAMINOPHEN 325 MG/1
650 TABLET ORAL EVERY 4 HOURS PRN
COMMUNITY
End: 2021-04-19

## 2019-02-02 RX ORDER — SODIUM CHLORIDE 9 MG/ML
1000 INJECTION, SOLUTION INTRAVENOUS ONCE
Status: COMPLETED | OUTPATIENT
Start: 2019-02-02 | End: 2019-02-02

## 2019-02-02 RX ORDER — POLYETHYLENE GLYCOL 3350 17 G/17G
1 POWDER, FOR SOLUTION ORAL
Status: DISCONTINUED | OUTPATIENT
Start: 2019-02-02 | End: 2019-02-04 | Stop reason: HOSPADM

## 2019-02-02 RX ORDER — ONDANSETRON 4 MG/1
4 TABLET, ORALLY DISINTEGRATING ORAL EVERY 4 HOURS PRN
Status: DISCONTINUED | OUTPATIENT
Start: 2019-02-02 | End: 2019-02-04 | Stop reason: HOSPADM

## 2019-02-02 RX ORDER — AMOXICILLIN 250 MG
2 CAPSULE ORAL 2 TIMES DAILY
Status: DISCONTINUED | OUTPATIENT
Start: 2019-02-02 | End: 2019-02-04 | Stop reason: HOSPADM

## 2019-02-02 RX ORDER — SODIUM CHLORIDE 9 MG/ML
INJECTION, SOLUTION INTRAVENOUS CONTINUOUS
Status: DISCONTINUED | OUTPATIENT
Start: 2019-02-02 | End: 2019-02-04 | Stop reason: HOSPADM

## 2019-02-02 RX ORDER — CLONIDINE HYDROCHLORIDE 0.1 MG/1
0.1 TABLET ORAL EVERY 6 HOURS PRN
Status: DISCONTINUED | OUTPATIENT
Start: 2019-02-02 | End: 2019-02-04 | Stop reason: HOSPADM

## 2019-02-02 RX ORDER — OXYCODONE HYDROCHLORIDE 5 MG/1
5 TABLET ORAL
Status: DISCONTINUED | OUTPATIENT
Start: 2019-02-02 | End: 2019-02-04 | Stop reason: HOSPADM

## 2019-02-02 RX ORDER — SODIUM CHLORIDE 9 MG/ML
500 INJECTION, SOLUTION INTRAVENOUS
Status: DISCONTINUED | OUTPATIENT
Start: 2019-02-02 | End: 2019-02-04 | Stop reason: HOSPADM

## 2019-02-02 RX ORDER — MORPHINE SULFATE 4 MG/ML
2 INJECTION, SOLUTION INTRAMUSCULAR; INTRAVENOUS
Status: DISCONTINUED | OUTPATIENT
Start: 2019-02-02 | End: 2019-02-04 | Stop reason: HOSPADM

## 2019-02-02 RX ORDER — ACETAMINOPHEN 325 MG/1
650 TABLET ORAL EVERY 6 HOURS PRN
Status: DISCONTINUED | OUTPATIENT
Start: 2019-02-02 | End: 2019-02-04 | Stop reason: HOSPADM

## 2019-02-02 RX ORDER — BISACODYL 10 MG
10 SUPPOSITORY, RECTAL RECTAL
Status: DISCONTINUED | OUTPATIENT
Start: 2019-02-02 | End: 2019-02-04 | Stop reason: HOSPADM

## 2019-02-02 RX ORDER — ACETAMINOPHEN 500 MG
1000 TABLET ORAL ONCE
Status: COMPLETED | OUTPATIENT
Start: 2019-02-02 | End: 2019-02-02

## 2019-02-02 RX ORDER — ONDANSETRON 2 MG/ML
4 INJECTION INTRAMUSCULAR; INTRAVENOUS EVERY 4 HOURS PRN
Status: DISCONTINUED | OUTPATIENT
Start: 2019-02-02 | End: 2019-02-04 | Stop reason: HOSPADM

## 2019-02-02 RX ORDER — NITROGLYCERIN 0.4 MG/1
0.4 TABLET SUBLINGUAL
Status: DISCONTINUED | OUTPATIENT
Start: 2019-02-02 | End: 2019-02-04 | Stop reason: HOSPADM

## 2019-02-02 RX ADMIN — SODIUM CHLORIDE: 9 INJECTION, SOLUTION INTRAVENOUS at 01:49

## 2019-02-02 RX ADMIN — SODIUM CHLORIDE 1000 ML: 9 INJECTION, SOLUTION INTRAVENOUS at 22:00

## 2019-02-02 RX ADMIN — SODIUM CHLORIDE: 9 INJECTION, SOLUTION INTRAVENOUS at 09:43

## 2019-02-02 RX ADMIN — CEFTRIAXONE 2 G: 2 INJECTION, POWDER, FOR SOLUTION INTRAMUSCULAR; INTRAVENOUS at 21:00

## 2019-02-02 RX ADMIN — ACETAMINOPHEN 1000 MG: 500 TABLET ORAL at 21:58

## 2019-02-02 RX ADMIN — SODIUM CHLORIDE 1000 ML: 9 INJECTION, SOLUTION INTRAVENOUS at 09:49

## 2019-02-02 RX ADMIN — OXYCODONE HYDROCHLORIDE 5 MG: 5 TABLET ORAL at 05:52

## 2019-02-02 RX ADMIN — ACETAMINOPHEN 650 MG: 325 TABLET, FILM COATED ORAL at 09:47

## 2019-02-02 RX ADMIN — OXYCODONE HYDROCHLORIDE 5 MG: 5 TABLET ORAL at 17:35

## 2019-02-02 RX ADMIN — OXYCODONE HYDROCHLORIDE 5 MG: 5 TABLET ORAL at 20:03

## 2019-02-02 ASSESSMENT — ENCOUNTER SYMPTOMS
BACK PAIN: 1
NEUROLOGICAL NEGATIVE: 1
CARDIOVASCULAR NEGATIVE: 1
PSYCHIATRIC NEGATIVE: 1
GASTROINTESTINAL NEGATIVE: 1
CHILLS: 1
EYES NEGATIVE: 1
FLANK PAIN: 1
FEVER: 1
RESPIRATORY NEGATIVE: 1

## 2019-02-02 ASSESSMENT — COGNITIVE AND FUNCTIONAL STATUS - GENERAL
DAILY ACTIVITIY SCORE: 24
SUGGESTED CMS G CODE MODIFIER DAILY ACTIVITY: CH
SUGGESTED CMS G CODE MODIFIER MOBILITY: CH
MOBILITY SCORE: 24

## 2019-02-02 ASSESSMENT — PATIENT HEALTH QUESTIONNAIRE - PHQ9
1. LITTLE INTEREST OR PLEASURE IN DOING THINGS: NOT AT ALL
2. FEELING DOWN, DEPRESSED, IRRITABLE, OR HOPELESS: NOT AT ALL
SUM OF ALL RESPONSES TO PHQ9 QUESTIONS 1 AND 2: 0

## 2019-02-02 NOTE — PROGRESS NOTES
· 2 RN skin check complete with ANTWAN Hooks.  Sacrum and elbow red and blanching. Pt laying on right side.

## 2019-02-02 NOTE — ED NOTES
Friend at bedside. Pt is eating food brought to her from cafeteria and appears much more comfortable.

## 2019-02-02 NOTE — H&P
[facility] Medicine History & Physical Note    Date of Service  2/2/2019    Primary Care Physician  No primary care provider on file.    Consultants  None    Code Status  Full code     Chief Complaint  Right flank pain    History of Presenting Illness  23 y.o. female with history of methamphetamine abuse but no other medical history was in her usual state of health until approximately 3 days prior to admission, she began to have right flank pain.  This was sharp in nature, nonradiating, not associated with dysuria.  She also began to have fever and chills was worsened until the day of admission.  She presented to the emergency department for further evaluation.  She currently denies any headache or vision changes, she does have some chest pain with deep inspiration but no abdominal pain, nausea or vomiting, diarrhea or constipation.  She has no other complaints.    Review of Systems  Review of Systems   Constitutional: Positive for chills and fever.   HENT: Negative.    Eyes: Negative.    Respiratory: Negative.    Cardiovascular: Negative.    Gastrointestinal: Negative.    Genitourinary: Positive for flank pain.   Musculoskeletal: Positive for back pain.   Skin: Negative.    Neurological: Negative.    Endo/Heme/Allergies: Negative.    Psychiatric/Behavioral: Negative.        Past Medical History   has a past medical history of Depression; Drug abuse (HCC); No known health problems; Ovarian cyst; Smoker; and UTI (urinary tract infection).    Surgical History   has a past surgical history that includes tonsillectomy; exam under anesthesia (10/27/2018); and skin abscess incision and drainage (10/27/2018).     Family History  family history includes Kidney Disease in her maternal grandmother and mother; No Known Problems in her father.     Social History   reports that she has been smoking Cigarettes.  She has been smoking about 0.50 packs per day. She has never used smokeless tobacco. She reports that she uses drugs,  including Intravenous. She reports that she does not drink alcohol.    Allergies  No Known Allergies    Medications  None       Physical Exam  Temp:  [37.1 °C (98.8 °F)-38.1 °C (100.6 °F)] 37.9 °C (100.3 °F)  Pulse:  [] 124  Resp:  [14-27] 14  BP: (112)/(73) 112/73  SpO2:  [78 %-99 %] 97 %    Physical Exam   Constitutional: She is oriented to person, place, and time. She appears well-developed and well-nourished. No distress.   HENT:   Head: Normocephalic and atraumatic.   Eyes: Pupils are equal, round, and reactive to light. Conjunctivae are normal.   Neck: Normal range of motion. Neck supple. No tracheal deviation present. No thyromegaly present.   Cardiovascular: Normal rate, regular rhythm and normal heart sounds.  Exam reveals no gallop and no friction rub.    No murmur heard.  Pulmonary/Chest: Effort normal and breath sounds normal. No respiratory distress. She has no wheezes. She has no rales.   Abdominal: Soft. Bowel sounds are normal. She exhibits no distension. There is no tenderness. There is no rebound.   Musculoskeletal: Normal range of motion. She exhibits no edema.   Lymphadenopathy:     She has no cervical adenopathy.   Neurological: She is alert and oriented to person, place, and time. No cranial nerve deficit.   Skin: Skin is warm and dry. She is not diaphoretic.   Psychiatric: She has a normal mood and affect.   Nursing note and vitals reviewed.      Laboratory:  Recent Labs      02/01/19 2149   WBC  16.1*   RBC  4.25   HEMOGLOBIN  13.3   HEMATOCRIT  39.3   MCV  92.5   MCH  31.3   MCHC  33.8   RDW  41.3   PLATELETCT  189   MPV  9.7     Recent Labs      02/01/19 2149   SODIUM  135   POTASSIUM  3.7   CHLORIDE  105   CO2  20   GLUCOSE  91   BUN  10   CREATININE  0.73   CALCIUM  8.4*     Recent Labs      02/01/19 2149   ALTSGPT  11   ASTSGOT  13   ALKPHOSPHAT  55   TBILIRUBIN  0.7   GLUCOSE  91                 No results for input(s): TROPONINI in the last 72 hours.    Urinalysis:     Recent Labs      02/01/19 2020   SPECGRAVITY  1.010  1.010   GLUCOSEUR  Negative   KETONES  15*   NITRITE  Positive*   LEUKESTERAS  Trace*   WBCURINE  20-50*   RBCURINE  5-10*   BACTERIA  Many*   EPITHELCELL  Negative        Imaging:  CT-RENAL COLIC EVALUATION(A/P W/O)   Final Result         1.  No acute abnormality.            Assessment/Plan:  I anticipate this patient will require at least two midnights for appropriate medical management, necessitating inpatient admission.    * Pyelonephritis   Assessment & Plan    Admit, IV antibiotics with rocephin, follow culture results.      Sepsis ([facility])   Assessment & Plan    This is sepsis (without associated acute organ dysfunction).  Continue rocephin, monitor culture results including blood cultures.      Methamphetamine abuse ([facility])   Assessment & Plan    Ongoing.  Suspect recent use.          VTE prophylaxis: SCD, lovenox

## 2019-02-02 NOTE — ED NOTES
Pt continues to be very agitated and screaming about her pain.  EPR aware.  New medication orders received.

## 2019-02-02 NOTE — ED PROVIDER NOTES
ED Provider Note    CHIEF COMPLAINT  Chief Complaint   Patient presents with   • Flank Pain   • Painful Urination       HPI  Vidhya Abdullahi is a 23 y.o. female who presents for evaluation of bilateral, right greater than left flank pain associated with dysuria, the patient has a history of UTI and states that she recently just finished antibiotics for both the UTI and syphilis, over the past 3 days she has had this pain.  No blood in urine, no fever, no nausea or vomiting, no chest pain.  The patient also has a history of methamphetamine abuse.  No other complaints although history is limited at this time secondary to acute distress    REVIEW OF SYSTEMS  Negative for fever, rash, chest pain, dyspnea, nausea, vomiting, diarrhea, headache, focal weakness, focal numbness, focal tingling. All other systems are negative.     PAST MEDICAL HISTORY  Past Medical History:   Diagnosis Date   • Depression    • Drug abuse (HCC)    • No known health problems    • Ovarian cyst    • Smoker    • UTI (urinary tract infection)        FAMILY HISTORY  No family history on file.    SOCIAL HISTORY  Social History   Substance Use Topics   • Smoking status: Current Every Day Smoker     Packs/day: 0.50     Types: Cigarettes   • Smokeless tobacco: Never Used   • Alcohol use No       SURGICAL HISTORY  Past Surgical History:   Procedure Laterality Date   • EXAM UNDER ANESTHESIA  10/27/2018    Procedure: EXAM UNDER ANESTHESIA;  Surgeon: Jahaira Butterfield M.D.;  Location: SURGERY [facility];  Service: Obstetrics   • SKIN ABSCESS INCISION AND DRAINAGE  10/27/2018    Procedure: SKIN ABSCESS INCISION AND DRAINAGE AND WORD CATHETER PLACEMENT;  Surgeon: Jahaira Butterfield M.D.;  Location: SURGERY [facility] [place];  Service: Obstetrics   • TONSILLECTOMY         CURRENT MEDICATIONS  I personally reviewed the medication list in the charting documentation.     ALLERGIES  No Known Allergies    MEDICAL RECORD  I have reviewed patient's  medical record and pertinent results are listed above.      PHYSICAL EXAM  VITAL SIGNS: /73   Pulse (!) 116   Temp 37.1 °C (98.8 °F) (Temporal)   Resp 19   Wt 61.3 kg (135 lb 2.3 oz)   LMP 01/02/2019 (Approximate)   SpO2 98%   BMI 24.72 kg/m²    Constitutional: Acute distress, frequently screaming but seems to be redirectable with conversation  HENT: Mucus membranes moist.    Eyes: No scleral icterus. Normal conjunctiva   Neck: Supple, comfortable, nonpainful range of motion.   Cardiovascular: Tachycardic, regular  Thorax & Lungs: Chest is nontender.  Lungs are clear to auscultation with good air movement bilaterally.  No wheeze, rhonchi, nor rales.   Abdomen: Soft, lower abdominal tenderness, no rebound, no guarding  Skin: Warm, dry. No rash appreciated  Extremities/Musculoskeletal: No sign of trauma. No asymmetric calf tenderness, erythema or edema. Normal range of motion   Neurologic: Alert & oriented. No focal deficits observed.   Psychiatric: Normal affect appropriate for the clinical situation.    DIAGNOSTIC STUDIES / PROCEDURES    LABS  Results for orders placed or performed during the hospital encounter of 02/01/19   BETA-HCG QUALITATIVE URINE   Result Value Ref Range    Beta-Hcg Urine Negative Negative   URINALYSIS,CULTURE IF INDICATED   Result Value Ref Range    Color Yellow     Character Clear     Specific Gravity 1.010 <1.035    Ph 7.0 5.0 - 8.0    Glucose Negative Negative mg/dL    Ketones 15 (A) Negative mg/dL    Protein Negative Negative mg/dL    Bilirubin Negative Negative    Urobilinogen, Urine 0.2 Negative    Nitrite Positive (A) Negative    Leukocyte Esterase Trace (A) Negative    Occult Blood Small (A) Negative    Micro Urine Req Microscopic    REFRACTOMETER SG   Result Value Ref Range    Specific Gravity 1.010    URINE MICROSCOPIC (W/UA)   Result Value Ref Range    WBC 20-50 (A) /hpf    RBC 5-10 (A) /hpf    Bacteria Many (A) None /hpf    Epithelial Cells Negative /hpf    Hyaline  Cast 0-2 /lpf   CBC WITH DIFFERENTIAL   Result Value Ref Range    WBC 16.1 (H) 4.8 - 10.8 K/uL    RBC 4.25 4.20 - 5.40 M/uL    Hemoglobin 13.3 12.0 - 16.0 g/dL    Hematocrit 39.3 37.0 - 47.0 %    MCV 92.5 81.4 - 97.8 fL    MCH 31.3 27.0 - 33.0 pg    MCHC 33.8 33.6 - 35.0 g/dL    RDW 41.3 35.9 - 50.0 fL    Platelet Count 189 164 - 446 K/uL    MPV 9.7 9.0 - 12.9 fL    Neutrophils-Polys 77.90 (H) 44.00 - 72.00 %    Lymphocytes 14.40 (L) 22.00 - 41.00 %    Monocytes 6.60 0.00 - 13.40 %    Eosinophils 0.40 0.00 - 6.90 %    Basophils 0.40 0.00 - 1.80 %    Immature Granulocytes 0.30 0.00 - 0.90 %    Nucleated RBC 0.00 /100 WBC    Neutrophils (Absolute) 12.57 (H) 2.00 - 7.15 K/uL    Lymphs (Absolute) 2.32 1.00 - 4.80 K/uL    Monos (Absolute) 1.06 (H) 0.00 - 0.85 K/uL    Eos (Absolute) 0.07 0.00 - 0.51 K/uL    Baso (Absolute) 0.07 0.00 - 0.12 K/uL    Immature Granulocytes (abs) 0.05 0.00 - 0.11 K/uL    NRBC (Absolute) 0.00 K/uL   COMP METABOLIC PANEL   Result Value Ref Range    Sodium 135 135 - 145 mmol/L    Potassium 3.7 3.6 - 5.5 mmol/L    Chloride 105 96 - 112 mmol/L    Co2 20 20 - 33 mmol/L    Anion Gap 10.0 0.0 - 11.9    Glucose 91 65 - 99 mg/dL    Bun 10 8 - 22 mg/dL    Creatinine 0.73 0.50 - 1.40 mg/dL    Calcium 8.4 (L) 8.5 - 10.5 mg/dL    AST(SGOT) 13 12 - 45 U/L    ALT(SGPT) 11 2 - 50 U/L    Alkaline Phosphatase 55 30 - 99 U/L    Total Bilirubin 0.7 0.1 - 1.5 mg/dL    Albumin 3.9 3.2 - 4.9 g/dL    Total Protein 6.7 6.0 - 8.2 g/dL    Globulin 2.8 1.9 - 3.5 g/dL    A-G Ratio 1.4 g/dL   LACTIC ACID   Result Value Ref Range    Lactic Acid 1.3 0.5 - 2.0 mmol/L   ESTIMATED GFR   Result Value Ref Range    GFR If African American >60 >60 mL/min/1.73 m 2    GFR If Non African American >60 >60 mL/min/1.73 m 2        RADIOLOGY  CT-RENAL COLIC EVALUATION(A/P W/O)   Final Result         1.  No acute abnormality.            COURSE & MEDICAL DECISION MAKING  I have reviewed any medical record information, laboratory studies and  radiographic results as noted above.  Differential diagnoses includes: UTI, pyelonephritis, ureterolithiasis    Encounter Summary: This is a 23 y.o. female with bilateral right greater than left flank pain, history of recurrent UTIs with recent treatment for 1, no history of kidney stones, presents in acute distress screaming in the room although she does seem distractible, no evidence of peritonitis on exam, her vital signs reveal tachycardia but no fever, no other focal findings on exam, will medicate with a low dose ketamine, will obtain a CT scan to rule out kidney stones as well as a urinalysis and pregnancy test and then she will be reevaluated ------- the low-dose ketamine seem to help the patient at least calm her down, CT scan rules out evidence of obstructive uropathy but her urinalysis is positive for infection.  The patient has been tachycardic and tachypneic but I initially attributed that to her screaming signs of distress, however even after that improved her tachycardia and tachypnea persisted so blood work was obtained which does reveal leukocytosis, this patient is septic, she was treated with a large bolus of IV fluids, broad-spectrum antibiotics, she was treated with Toradol, she would continue to be intermittently in distress albeit still distractible, however she was admitted to the hospital given the evidence of sepsis and her seemingly intractable pain    HYDRATION: Based on the patient's presentation of Sepsis the patient was given IV fluids. IV Hydration was used because oral hydration was not adequate alone. Upon recheck following hydration, the patient was Guarded.      CRITICAL CARE  The very real possibilty of a deterioration of this patient's condition required the highest level of my preparedness for sudden, emergent intervention.  I provided critical care services, which included medication orders, frequent reevaluations of the patient's condition and response to treatment, ordering  and reviewing test results, and discussing the case with various consultants.  The critical care time associated with the care of the patient was 39 minutes. Review chart for interventions. This time is exclusive of any other billable procedures.       DISPOSITION: Admit in guarded condition      FINAL IMPRESSION  1. Pyelonephritis    2. Sepsis, due to unspecified organism (HCC)    3. Flank pain           This dictation was created using voice recognition software. The accuracy of the dictation is limited to the abilities of the software. I expect there may be some errors of grammar and possibly content. The nursing notes were reviewed and certain aspects of this information were incorporated into this note.    Electronically signed by: Richie Cedillo, 2/1/2019 7:01 PM

## 2019-02-02 NOTE — DISCHARGE PLANNING
Spoke with Pt regarding her insurance [employer]. Pt states understanding that her insurance is out of network. Pt states she wishes to remain here at this time. Spoke with MUSTAPHA Villa who had pt sign out of network form.
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
## 2019-02-02 NOTE — PROGRESS NOTES
Pt has fever and tachy. MD Baker notified. He ordered 1L bolus and tylenol to be given. RN to administer.

## 2019-02-02 NOTE — ASSESSMENT & PLAN NOTE
This is sepsis (without associated acute organ dysfunction).    Likely source is pyelonephritis  Patient has been started on IV fluids per septic protocol  Lactate is within normal limits  Patient is started on IV ceftriaxone  Follow blood and urine cultures

## 2019-02-02 NOTE — ED NOTES
Pt to R 9 from triage, pt inconsolable, reports severe back pain.  Hx of frequent uti, seen in October for vaginal cyst that required conscious sedation in order to I&D.

## 2019-02-02 NOTE — DISCHARGE PLANNING
Care Transition Team Assessment    Information Source  Orientation : Oriented x 4  Information Given By: Patient  Informant's Name:  (Vidhya Abdullahi)  Who is responsible for making decisions for patient? : Patient    Readmission Evaluation  Is this a readmission?: No    Elopement Risk  Legal Hold: No  Ambulatory or Self Mobile in Wheelchair: No-Not an Elopement Risk  Elopement Risk: Not at Risk for Elopement    Interdisciplinary Discharge Planning  Does Admitting Nurse Feel This Could be a Complex Discharge?: No  Primary Care Physician:  (Pt states none)  Lives with - Patient's Self Care Capacity: Parents  Support Systems: Parent  Housing / Facility: 1 Story Apartment / Condo  Do You Take your Prescribed Medications Regularly: Yes  Able to Return to Previous ADL's: Yes  Mobility Issues: No  Patient Expects to be Discharged to::  (Home)  Assistance Needed: No  Durable Medical Equipment: Not Applicable    Discharge Preparedness  What is your plan after discharge?:  (Home)  What are your discharge supports?: Parent  Prior Functional Level: Ambulatory  Difficulity with ADLs: None  Difficulity with IADLs: None    Functional Assesment  Prior Functional Level: Ambulatory    Finances  Financial Barriers to Discharge: No  Prescription Coverage: Yes    Vision / Hearing Impairment  Vision Impairment : No  Hearing Impairment : No         Advance Directive  Advance Directive?: None  Advance Directive offered?: AD Booklet refused    Domestic Abuse  Have you ever been the victim of abuse or violence?: No  Physical Abuse or Sexual Abuse: No  Verbal Abuse or Emotional Abuse: No  Possible Abuse Reported to:: Not Applicable    Psychological Assessment  History of Substance Abuse: Methamphetamine  History of Psychiatric Problems: Yes (Depression)  Non-compliant with Treatment: No  Newly Diagnosed Illness: No    Discharge Risks or Barriers  Discharge risks or barriers?: No PCP  Patient risk factors: No PCP    Anticipated Discharge  Information  Anticipated discharge disposition: Home  Discharge Address:  ([facility] [address], [facility]  Discharge Contact Phone Number:  (Marina Robertson  ([facility])  [phone])

## 2019-02-02 NOTE — ASSESSMENT & PLAN NOTE
IV antibiotics with rocephin, follow culture results.   Pain control with oxycodone and IV morphine for breakthrough

## 2019-02-02 NOTE — PROGRESS NOTES
"Pt arrived from the ED lethargic but wakes when asked. Pt \"just wants to sleep.\" VSS. Monitor room notified . Pt oriented to call system. Bed locked in low position with fall precautions in place and call light in reach.    "

## 2019-02-02 NOTE — CARE PLAN
Problem: Communication  Goal: The ability to communicate needs accurately and effectively will improve  Outcome: PROGRESSING SLOWER THAN EXPECTED      Problem: Safety  Goal: Will remain free from falls  Outcome: PROGRESSING AS EXPECTED

## 2019-02-02 NOTE — PROGRESS NOTES
Hospital Medicine Daily Progress Note    Date of Service  2/3/2019    Chief Complaint  23 y.o. female admitted 2/1/2019 with right flank pain    Hospital Course    23 yr old female with a PMH of IV meth abuse presented with right flank pain and was noted to be septic secondary to pyelonephritis.       Interval Problem Update  2/2 Patient continues to be tachycardic at 125. Patient is report chest pain. I have ordered stat EKG, CXR, Troponin and Dimer. Bolus with 1 L of NS.   Patient is refusing blood draws. I explained the importance of performing these blood tests to rule out PE however she is adamant on not wanting any further blood draws. The patient denies any previous history of blood clots. She is not hypoxic at this time. Her tachycardia can be explained by sepsis and recent methamephetamine abuse. We will continue to monitor the patient closely.     2/3 Troponin elevated at 0.12>0.15. I believe this is demand ischemia in the setting of sepsis, tachycardia and methamphetamine toxicity. Ordered full dose of aspirin but patient refused. Blood cultures positive for gram negative rods. Continue IV Ceftriaxone. Patient is agitated and crying. She has been very difficult to manage per staff. I will order ativan PRN for agitation. She remains tachycardic, bolus with 1L of NS.     Consultants/Specialty  None    Code Status  Full Code    Disposition  TBD    Review of Systems  Review of Systems   Constitutional: Positive for chills and malaise/fatigue. Negative for diaphoresis and fever.   HENT: Negative for hearing loss and sore throat.    Eyes: Negative for blurred vision.   Respiratory: Negative for cough, sputum production, shortness of breath and wheezing.    Cardiovascular: Positive for chest pain. Negative for palpitations and leg swelling.   Gastrointestinal: Negative for abdominal pain, blood in stool, diarrhea, nausea and vomiting.   Genitourinary: Positive for flank pain. Negative for dysuria and urgency.    Musculoskeletal: Negative for back pain, joint pain, myalgias and neck pain.   Skin: Negative for rash.   Neurological: Negative for dizziness, focal weakness, seizures and headaches.   Endo/Heme/Allergies: Does not bruise/bleed easily.   Psychiatric/Behavioral: Positive for substance abuse. Negative for suicidal ideas. The patient is nervous/anxious.    All other systems reviewed and are negative.       Physical Exam  Temp:  [36.4 °C (97.6 °F)-38.3 °C (100.9 °F)] 38.3 °C (100.9 °F)  Pulse:  [] 125  Resp:  [14-27] 17  BP: (101-112)/(66-76) 111/76  SpO2:  [78 %-99 %] 96 %    Physical Exam   Constitutional: She is oriented to person, place, and time. She appears well-developed and well-nourished. No distress.   HENT:   Head: Normocephalic and atraumatic.   Mouth/Throat: Oropharynx is clear and moist.   Eyes: Pupils are equal, round, and reactive to light. Conjunctivae are normal.   Neck: Neck supple. No thyromegaly present.   Cardiovascular: Regular rhythm and normal heart sounds.    tachycardic   Pulmonary/Chest: Effort normal and breath sounds normal. No respiratory distress. She has no wheezes. She has no rales.   Abdominal: Soft. Bowel sounds are normal. She exhibits no distension. There is no tenderness. There is no rebound.   Musculoskeletal: Normal range of motion. She exhibits no edema or tenderness.   Neurological: She is alert and oriented to person, place, and time. No cranial nerve deficit. Coordination normal.   Skin: Skin is warm and dry.   Psychiatric: Her mood appears anxious. Her affect is labile. She is agitated.   Nursing note and vitals reviewed.      Fluids    Intake/Output Summary (Last 24 hours) at 02/02/19 0956  Last data filed at 02/01/19 2210   Gross per 24 hour   Intake             1150 ml   Output                0 ml   Net             1150 ml       Laboratory  Recent Labs      02/01/19   8878   WBC  16.1*   RBC  4.25   HEMOGLOBIN  13.3   HEMATOCRIT  39.3   MCV  92.5   MCH  31.3    MCHC  33.8   RDW  41.3   PLATELETCT  189   MPV  9.7     Recent Labs      02/01/19   2149   SODIUM  135   POTASSIUM  3.7   CHLORIDE  105   CO2  20   GLUCOSE  91   BUN  10   CREATININE  0.73   CALCIUM  8.4*                   Imaging  EC-ECHOCARDIOGRAM COMPLETE W/O CONT         DX-CHEST-PORTABLE (1 VIEW)   Final Result      No acute cardiac or pulmonary abnormality is noted.      CT-RENAL COLIC EVALUATION(A/P W/O)   Final Result         1.  No acute abnormality.           Assessment/Plan  * Pyelonephritis- (present on admission)   Assessment & Plan    IV antibiotics with rocephin, follow culture results.   Pain control with oxycodone and IV morphine for breakthrough     Sepsis (HCC)- (present on admission)   Assessment & Plan    This is sepsis (without associated acute organ dysfunction).    Likely source is pyelonephritis  Patient has been started on IV fluids per septic protocol  Lactate is within normal limits  Patient is started on IV ceftriaxone  Follow blood and urine cultures       Elevated troponin- (present on admission)   Assessment & Plan    Likely secondary to demand ischemia in the setting of methamphetamine abuse, sepsis and tachycardia  Trend troponin  Ordered full dose of aspirin      Bacteremia- (present on admission)   Assessment & Plan    Blood culture positive for gram negative rods  Likely source is pyelonephritis  Check 2D echo to rule out vegetations  Continue IV Ceftriaxone         Methamphetamine abuse (HCC)- (present on admission)   Assessment & Plan    Ongoing  Patient counseled on cessation  Ativan PRN for agitation           VTE prophylaxis: lovenox

## 2019-02-02 NOTE — ED TRIAGE NOTES
Ambulates to triage  Chief Complaint   Patient presents with   • Flank Pain   • Painful Urination     Pt is crying in pain, pain gets worse with a deep breath, has never had pain like this before, denies any hx of kidney stones.  Charge called for a room.

## 2019-02-02 NOTE — PROGRESS NOTES
MD Baker notified of positive blood cultures. He stated he was aware and pt is covered with current antibiotic regimen.

## 2019-02-02 NOTE — PROGRESS NOTES
Pt screaming crying c/o chest pain. RN notified MD Baker. He ordered a chest xray, EKG and labs (trop and ddimer) EKG done. When xray tech came into room to perform xray, pt stated she didn't need it because the pain was gone. RN educated pt that the xray was still necessary. Xray done. Phlebotomy at bedside to draw labs. Pt refusing to let him draw, continuing to scream and cry. Pt educated by RN the necessity of lab draw due to pt having chest pain and her refusal of labs earlier this morning as well. She stated she understood and still refused. MD Baker aware of all.

## 2019-02-02 NOTE — PROGRESS NOTES
Med rec complete per pt at bedside  Allergies have been verified   No oral ABX within the last 30 days  Pt Home [facility]

## 2019-02-03 ENCOUNTER — APPOINTMENT (OUTPATIENT)
Dept: CARDIOLOGY | Facility: MEDICAL CENTER | Age: 24
DRG: 872 | End: 2019-02-03
Attending: INTERNAL MEDICINE
Payer: COMMERCIAL

## 2019-02-03 PROBLEM — R78.81 BACTEREMIA: Status: ACTIVE | Noted: 2019-02-03

## 2019-02-03 PROBLEM — R79.89 ELEVATED TROPONIN: Status: ACTIVE | Noted: 2019-02-03

## 2019-02-03 LAB
ANION GAP SERPL CALC-SCNC: 8 MMOL/L (ref 0–11.9)
BACTERIA UR CULT: ABNORMAL
BACTERIA UR CULT: ABNORMAL
BASOPHILS # BLD AUTO: 0.3 % (ref 0–1.8)
BASOPHILS # BLD: 0.04 K/UL (ref 0–0.12)
BUN SERPL-MCNC: 6 MG/DL (ref 8–22)
CALCIUM SERPL-MCNC: 7.6 MG/DL (ref 8.5–10.5)
CHLORIDE SERPL-SCNC: 110 MMOL/L (ref 96–112)
CO2 SERPL-SCNC: 18 MMOL/L (ref 20–33)
CREAT SERPL-MCNC: 0.52 MG/DL (ref 0.5–1.4)
EOSINOPHIL # BLD AUTO: 0.11 K/UL (ref 0–0.51)
EOSINOPHIL NFR BLD: 1 % (ref 0–6.9)
ERYTHROCYTE [DISTWIDTH] IN BLOOD BY AUTOMATED COUNT: 42.3 FL (ref 35.9–50)
GLUCOSE SERPL-MCNC: 112 MG/DL (ref 65–99)
HCT VFR BLD AUTO: 33.7 % (ref 37–47)
HGB BLD-MCNC: 11.4 G/DL (ref 12–16)
IMM GRANULOCYTES # BLD AUTO: 0.04 K/UL (ref 0–0.11)
IMM GRANULOCYTES NFR BLD AUTO: 0.3 % (ref 0–0.9)
LV EJECT FRACT  99904: 55
LV EJECT FRACT MOD 4C 99902: 51.9
LYMPHOCYTES # BLD AUTO: 2.68 K/UL (ref 1–4.8)
LYMPHOCYTES NFR BLD: 23.2 % (ref 22–41)
MCH RBC QN AUTO: 31.5 PG (ref 27–33)
MCHC RBC AUTO-ENTMCNC: 33.8 G/DL (ref 33.6–35)
MCV RBC AUTO: 93.1 FL (ref 81.4–97.8)
MONOCYTES # BLD AUTO: 1.06 K/UL (ref 0–0.85)
MONOCYTES NFR BLD AUTO: 9.2 % (ref 0–13.4)
NEUTROPHILS # BLD AUTO: 7.64 K/UL (ref 2–7.15)
NEUTROPHILS NFR BLD: 66 % (ref 44–72)
NRBC # BLD AUTO: 0 K/UL
NRBC BLD-RTO: 0 /100 WBC
PLATELET # BLD AUTO: 138 K/UL (ref 164–446)
PMV BLD AUTO: 9.7 FL (ref 9–12.9)
POTASSIUM SERPL-SCNC: 3.6 MMOL/L (ref 3.6–5.5)
RBC # BLD AUTO: 3.62 M/UL (ref 4.2–5.4)
SIGNIFICANT IND 70042: ABNORMAL
SITE SITE: ABNORMAL
SODIUM SERPL-SCNC: 136 MMOL/L (ref 135–145)
SOURCE SOURCE: ABNORMAL
WBC # BLD AUTO: 11.6 K/UL (ref 4.8–10.8)

## 2019-02-03 PROCEDURE — A9270 NON-COVERED ITEM OR SERVICE: HCPCS | Performed by: INTERNAL MEDICINE

## 2019-02-03 PROCEDURE — 700111 HCHG RX REV CODE 636 W/ 250 OVERRIDE (IP): Performed by: HOSPITALIST

## 2019-02-03 PROCEDURE — 85025 COMPLETE CBC W/AUTO DIFF WBC: CPT

## 2019-02-03 PROCEDURE — 770020 HCHG ROOM/CARE - TELE (206)

## 2019-02-03 PROCEDURE — 3E0234Z INTRODUCTION OF SERUM, TOXOID AND VACCINE INTO MUSCLE, PERCUTANEOUS APPROACH: ICD-10-PCS | Performed by: INTERNAL MEDICINE

## 2019-02-03 PROCEDURE — A9270 NON-COVERED ITEM OR SERVICE: HCPCS | Performed by: HOSPITALIST

## 2019-02-03 PROCEDURE — 93306 TTE W/DOPPLER COMPLETE: CPT | Mod: 26 | Performed by: INTERNAL MEDICINE

## 2019-02-03 PROCEDURE — 80048 BASIC METABOLIC PNL TOTAL CA: CPT

## 2019-02-03 PROCEDURE — 700102 HCHG RX REV CODE 250 W/ 637 OVERRIDE(OP): Performed by: INTERNAL MEDICINE

## 2019-02-03 PROCEDURE — 90471 IMMUNIZATION ADMIN: CPT

## 2019-02-03 PROCEDURE — 36415 COLL VENOUS BLD VENIPUNCTURE: CPT

## 2019-02-03 PROCEDURE — 700105 HCHG RX REV CODE 258: Performed by: HOSPITALIST

## 2019-02-03 PROCEDURE — 90732 PPSV23 VACC 2 YRS+ SUBQ/IM: CPT | Performed by: INTERNAL MEDICINE

## 2019-02-03 PROCEDURE — 700111 HCHG RX REV CODE 636 W/ 250 OVERRIDE (IP): Performed by: INTERNAL MEDICINE

## 2019-02-03 PROCEDURE — 93306 TTE W/DOPPLER COMPLETE: CPT

## 2019-02-03 PROCEDURE — 700102 HCHG RX REV CODE 250 W/ 637 OVERRIDE(OP): Performed by: HOSPITALIST

## 2019-02-03 PROCEDURE — 700105 HCHG RX REV CODE 258: Performed by: INTERNAL MEDICINE

## 2019-02-03 PROCEDURE — 99233 SBSQ HOSP IP/OBS HIGH 50: CPT | Performed by: INTERNAL MEDICINE

## 2019-02-03 RX ORDER — LORAZEPAM 1 MG/1
.5-1 TABLET ORAL EVERY 6 HOURS PRN
Status: DISCONTINUED | OUTPATIENT
Start: 2019-02-03 | End: 2019-02-04 | Stop reason: HOSPADM

## 2019-02-03 RX ORDER — LORAZEPAM 1 MG/1
0.5 TABLET ORAL EVERY 6 HOURS PRN
Status: DISCONTINUED | OUTPATIENT
Start: 2019-02-03 | End: 2019-02-03

## 2019-02-03 RX ORDER — LORAZEPAM 1 MG/1
1 TABLET ORAL EVERY 4 HOURS PRN
Status: DISCONTINUED | OUTPATIENT
Start: 2019-02-03 | End: 2019-02-03

## 2019-02-03 RX ADMIN — SODIUM CHLORIDE: 9 INJECTION, SOLUTION INTRAVENOUS at 09:08

## 2019-02-03 RX ADMIN — LORAZEPAM 1 MG: 1 TABLET ORAL at 18:40

## 2019-02-03 RX ADMIN — LORAZEPAM 0.5 MG: 1 TABLET ORAL at 09:38

## 2019-02-03 RX ADMIN — CEFTRIAXONE 2 G: 2 INJECTION, POWDER, FOR SOLUTION INTRAMUSCULAR; INTRAVENOUS at 21:23

## 2019-02-03 RX ADMIN — PROMETHAZINE HYDROCHLORIDE 25 MG: 25 TABLET ORAL at 09:02

## 2019-02-03 RX ADMIN — SODIUM CHLORIDE: 9 INJECTION, SOLUTION INTRAVENOUS at 21:29

## 2019-02-03 RX ADMIN — PNEUMOCOCCAL VACCINE POLYVALENT 25 MCG
25; 25; 25; 25; 25; 25; 25; 25; 25; 25; 25; 25; 25; 25; 25; 25; 25; 25; 25; 25; 25; 25; 25 INJECTION, SOLUTION INTRAMUSCULAR; SUBCUTANEOUS at 09:04

## 2019-02-03 RX ADMIN — MORPHINE SULFATE 2 MG: 4 INJECTION INTRAVENOUS at 09:02

## 2019-02-03 RX ADMIN — SODIUM CHLORIDE: 9 INJECTION, SOLUTION INTRAVENOUS at 02:29

## 2019-02-03 RX ADMIN — MORPHINE SULFATE 2 MG: 4 INJECTION INTRAVENOUS at 11:56

## 2019-02-03 RX ADMIN — ACETAMINOPHEN 650 MG: 325 TABLET, FILM COATED ORAL at 16:34

## 2019-02-03 RX ADMIN — LORAZEPAM 1 MG: 1 TABLET ORAL at 12:52

## 2019-02-03 RX ADMIN — MORPHINE SULFATE 2 MG: 4 INJECTION INTRAVENOUS at 16:34

## 2019-02-03 ASSESSMENT — ENCOUNTER SYMPTOMS
FLANK PAIN: 1
NERVOUS/ANXIOUS: 1
DIAPHORESIS: 0
DIARRHEA: 0
COUGH: 0
CHILLS: 1
BACK PAIN: 0
BRUISES/BLEEDS EASILY: 0
BLOOD IN STOOL: 0
PALPITATIONS: 0
FEVER: 0
SEIZURES: 0
BLURRED VISION: 0
SPUTUM PRODUCTION: 0
WHEEZING: 0
ABDOMINAL PAIN: 0
NECK PAIN: 0
DIZZINESS: 0
SHORTNESS OF BREATH: 0
NAUSEA: 0
VOMITING: 0
HEADACHES: 0
MYALGIAS: 0
SORE THROAT: 0
FOCAL WEAKNESS: 0

## 2019-02-03 ASSESSMENT — LIFESTYLE VARIABLES: SUBSTANCE_ABUSE: 1

## 2019-02-03 NOTE — ASSESSMENT & PLAN NOTE
Blood culture positive for gram negative rods  Likely source is pyelonephritis  Check 2D echo to rule out vegetations  Continue IV Ceftriaxone

## 2019-02-03 NOTE — CARE PLAN
Problem: Safety  Goal: Will remain free from injury    Intervention: Provide assistance with mobility  Pt ambulated to bathroom with SBA      Problem: Pain Management  Goal: Pain level will decrease to patient's comfort goal    Intervention: Follow pain managment plan developed in collaboration with patient and Interdisciplinary Team  Pt c/o pain and medicated per MAR. Heat and ice therapy offered and refused.

## 2019-02-03 NOTE — ASSESSMENT & PLAN NOTE
Likely secondary to demand ischemia in the setting of methamphetamine abuse, sepsis and tachycardia  Trend troponin  Ordered full dose of aspirin

## 2019-02-03 NOTE — PROGRESS NOTES
Pt c/o chest pain again. EKG done again. She is agreeable to have labs drawn. Labs ordered again. Awaiting phlebotomy. MD Baker aware.

## 2019-02-03 NOTE — PROGRESS NOTES
Report received by day shift RN. Pt screaming in pain. Physician notified and ordered bolus and lactic draw to treat hypotension. Pt updated to POC and pain regimen. Bed locked in low position with fall precautions in place and call light in reach.

## 2019-02-04 ENCOUNTER — HOSPITAL ENCOUNTER (INPATIENT)
Facility: MEDICAL CENTER | Age: 24
LOS: 1 days | DRG: 690 | End: 2019-02-05
Attending: EMERGENCY MEDICINE | Admitting: HOSPITALIST
Payer: COMMERCIAL

## 2019-02-04 VITALS
RESPIRATION RATE: 20 BRPM | BODY MASS INDEX: 27.38 KG/M2 | HEART RATE: 104 BPM | OXYGEN SATURATION: 95 % | DIASTOLIC BLOOD PRESSURE: 56 MMHG | SYSTOLIC BLOOD PRESSURE: 93 MMHG | TEMPERATURE: 98.4 F | WEIGHT: 149.69 LBS

## 2019-02-04 DIAGNOSIS — N12 PYELONEPHRITIS: ICD-10-CM

## 2019-02-04 PROBLEM — F17.200 TOBACCO DEPENDENCE: Status: ACTIVE | Noted: 2019-02-04

## 2019-02-04 PROBLEM — R45.1 AGITATION: Status: ACTIVE | Noted: 2019-02-04

## 2019-02-04 LAB
BACTERIA BLD CULT: ABNORMAL
BACTERIA BLD CULT: ABNORMAL
SIGNIFICANT IND 70042: ABNORMAL
SITE SITE: ABNORMAL
SOURCE SOURCE: ABNORMAL

## 2019-02-04 PROCEDURE — 99223 1ST HOSP IP/OBS HIGH 75: CPT | Mod: AI,25 | Performed by: HOSPITALIST

## 2019-02-04 PROCEDURE — 700102 HCHG RX REV CODE 250 W/ 637 OVERRIDE(OP): Performed by: INTERNAL MEDICINE

## 2019-02-04 PROCEDURE — 700102 HCHG RX REV CODE 250 W/ 637 OVERRIDE(OP): Performed by: HOSPITALIST

## 2019-02-04 PROCEDURE — A9270 NON-COVERED ITEM OR SERVICE: HCPCS | Performed by: INTERNAL MEDICINE

## 2019-02-04 PROCEDURE — 700111 HCHG RX REV CODE 636 W/ 250 OVERRIDE (IP): Performed by: HOSPITALIST

## 2019-02-04 PROCEDURE — 770006 HCHG ROOM/CARE - MED/SURG/GYN SEMI*

## 2019-02-04 PROCEDURE — A9270 NON-COVERED ITEM OR SERVICE: HCPCS | Performed by: HOSPITALIST

## 2019-02-04 PROCEDURE — 96365 THER/PROPH/DIAG IV INF INIT: CPT

## 2019-02-04 PROCEDURE — 99285 EMERGENCY DEPT VISIT HI MDM: CPT

## 2019-02-04 PROCEDURE — 700105 HCHG RX REV CODE 258: Performed by: INTERNAL MEDICINE

## 2019-02-04 PROCEDURE — 700105 HCHG RX REV CODE 258: Performed by: HOSPITALIST

## 2019-02-04 RX ORDER — BISACODYL 10 MG
10 SUPPOSITORY, RECTAL RECTAL
Status: DISCONTINUED | OUTPATIENT
Start: 2019-02-04 | End: 2019-02-05 | Stop reason: HOSPADM

## 2019-02-04 RX ORDER — ONDANSETRON 2 MG/ML
4 INJECTION INTRAMUSCULAR; INTRAVENOUS EVERY 4 HOURS PRN
Status: DISCONTINUED | OUTPATIENT
Start: 2019-02-04 | End: 2019-02-05 | Stop reason: HOSPADM

## 2019-02-04 RX ORDER — PROMETHAZINE HYDROCHLORIDE 25 MG/1
12.5-25 TABLET ORAL EVERY 4 HOURS PRN
Status: DISCONTINUED | OUTPATIENT
Start: 2019-02-04 | End: 2019-02-05 | Stop reason: HOSPADM

## 2019-02-04 RX ORDER — MORPHINE SULFATE 4 MG/ML
2 INJECTION, SOLUTION INTRAMUSCULAR; INTRAVENOUS EVERY 4 HOURS PRN
Status: DISCONTINUED | OUTPATIENT
Start: 2019-02-04 | End: 2019-02-05 | Stop reason: HOSPADM

## 2019-02-04 RX ORDER — NICOTINE 21 MG/24HR
21 PATCH, TRANSDERMAL 24 HOURS TRANSDERMAL
Status: DISCONTINUED | OUTPATIENT
Start: 2019-02-04 | End: 2019-02-05 | Stop reason: HOSPADM

## 2019-02-04 RX ORDER — RISPERIDONE 0.5 MG/1
1 TABLET ORAL NIGHTLY
Status: DISCONTINUED | OUTPATIENT
Start: 2019-02-04 | End: 2019-02-05 | Stop reason: HOSPADM

## 2019-02-04 RX ORDER — SODIUM CHLORIDE 9 MG/ML
INJECTION, SOLUTION INTRAVENOUS CONTINUOUS
Status: DISCONTINUED | OUTPATIENT
Start: 2019-02-04 | End: 2019-02-05 | Stop reason: HOSPADM

## 2019-02-04 RX ORDER — POLYETHYLENE GLYCOL 3350 17 G/17G
1 POWDER, FOR SOLUTION ORAL
Status: DISCONTINUED | OUTPATIENT
Start: 2019-02-04 | End: 2019-02-05 | Stop reason: HOSPADM

## 2019-02-04 RX ORDER — OXYCODONE HYDROCHLORIDE 5 MG/1
5-10 TABLET ORAL EVERY 4 HOURS PRN
Status: DISCONTINUED | OUTPATIENT
Start: 2019-02-04 | End: 2019-02-05 | Stop reason: HOSPADM

## 2019-02-04 RX ORDER — ONDANSETRON 4 MG/1
4 TABLET, ORALLY DISINTEGRATING ORAL EVERY 4 HOURS PRN
Status: DISCONTINUED | OUTPATIENT
Start: 2019-02-04 | End: 2019-02-05 | Stop reason: HOSPADM

## 2019-02-04 RX ORDER — PROMETHAZINE HYDROCHLORIDE 12.5 MG/1
12.5-25 SUPPOSITORY RECTAL EVERY 4 HOURS PRN
Status: DISCONTINUED | OUTPATIENT
Start: 2019-02-04 | End: 2019-02-05 | Stop reason: HOSPADM

## 2019-02-04 RX ORDER — AMOXICILLIN 250 MG
2 CAPSULE ORAL 2 TIMES DAILY
Status: DISCONTINUED | OUTPATIENT
Start: 2019-02-04 | End: 2019-02-05 | Stop reason: HOSPADM

## 2019-02-04 RX ORDER — HALOPERIDOL 5 MG/ML
5 INJECTION INTRAMUSCULAR EVERY 4 HOURS PRN
Status: DISCONTINUED | OUTPATIENT
Start: 2019-02-04 | End: 2019-02-05 | Stop reason: HOSPADM

## 2019-02-04 RX ORDER — ACETAMINOPHEN 325 MG/1
650 TABLET ORAL EVERY 6 HOURS PRN
Status: DISCONTINUED | OUTPATIENT
Start: 2019-02-04 | End: 2019-02-05 | Stop reason: HOSPADM

## 2019-02-04 RX ORDER — DIPHENHYDRAMINE HYDROCHLORIDE 50 MG/ML
25 INJECTION INTRAMUSCULAR; INTRAVENOUS EVERY 6 HOURS PRN
Status: DISCONTINUED | OUTPATIENT
Start: 2019-02-04 | End: 2019-02-05 | Stop reason: HOSPADM

## 2019-02-04 RX ORDER — KETOROLAC TROMETHAMINE 30 MG/ML
30 INJECTION, SOLUTION INTRAMUSCULAR; INTRAVENOUS EVERY 6 HOURS PRN
Status: DISCONTINUED | OUTPATIENT
Start: 2019-02-04 | End: 2019-02-05 | Stop reason: HOSPADM

## 2019-02-04 RX ADMIN — MORPHINE SULFATE 2 MG: 4 INJECTION INTRAVENOUS at 10:12

## 2019-02-04 RX ADMIN — LORAZEPAM 1 MG: 1 TABLET ORAL at 09:19

## 2019-02-04 RX ADMIN — NICOTINE 21 MG: 21 PATCH, EXTENDED RELEASE TRANSDERMAL at 17:34

## 2019-02-04 RX ADMIN — ACETAMINOPHEN 650 MG: 325 TABLET, FILM COATED ORAL at 09:19

## 2019-02-04 RX ADMIN — SODIUM CHLORIDE: 9 INJECTION, SOLUTION INTRAVENOUS at 06:29

## 2019-02-04 RX ADMIN — CEFTRIAXONE SODIUM 2 G: 2 INJECTION, POWDER, FOR SOLUTION INTRAMUSCULAR; INTRAVENOUS at 17:04

## 2019-02-04 RX ADMIN — OXYCODONE HYDROCHLORIDE 10 MG: 5 TABLET ORAL at 17:46

## 2019-02-04 RX ADMIN — MORPHINE SULFATE 2 MG: 4 INJECTION INTRAVENOUS at 07:11

## 2019-02-04 RX ADMIN — SODIUM CHLORIDE: 9 INJECTION, SOLUTION INTRAVENOUS at 17:46

## 2019-02-04 RX ADMIN — KETOROLAC TROMETHAMINE 30 MG: 30 INJECTION, SOLUTION INTRAMUSCULAR at 17:46

## 2019-02-04 ASSESSMENT — ENCOUNTER SYMPTOMS
WEIGHT LOSS: 0
SORE THROAT: 0
HEMOPTYSIS: 0
COUGH: 0
HALLUCINATIONS: 0
BLURRED VISION: 0
NECK PAIN: 0
MYALGIAS: 0
NAUSEA: 1
DOUBLE VISION: 0
CLAUDICATION: 0
PALPITATIONS: 0
BACK PAIN: 0
NAUSEA: 0
MYALGIAS: 1
NERVOUS/ANXIOUS: 1
VOMITING: 0
CHILLS: 0
SPUTUM PRODUCTION: 0
BLURRED VISION: 0
FEVER: 0
COUGH: 0
DIAPHORESIS: 0
CHILLS: 1
FEVER: 0
BRUISES/BLEEDS EASILY: 0
BACK PAIN: 0
BLOOD IN STOOL: 0
SHORTNESS OF BREATH: 0
FOCAL WEAKNESS: 0
TINGLING: 0
TREMORS: 0
FLANK PAIN: 1
HEADACHES: 0
DIZZINESS: 0
HEADACHES: 0
FLANK PAIN: 1
NECK PAIN: 0
ABDOMINAL PAIN: 0
WHEEZING: 0
VOMITING: 0
SPUTUM PRODUCTION: 0
DIARRHEA: 0
PALPITATIONS: 0
DIZZINESS: 0
SEIZURES: 0

## 2019-02-04 ASSESSMENT — PATIENT HEALTH QUESTIONNAIRE - PHQ9
2. FEELING DOWN, DEPRESSED, IRRITABLE, OR HOPELESS: NOT AT ALL
1. LITTLE INTEREST OR PLEASURE IN DOING THINGS: NOT AT ALL
SUM OF ALL RESPONSES TO PHQ9 QUESTIONS 1 AND 2: 0

## 2019-02-04 ASSESSMENT — COGNITIVE AND FUNCTIONAL STATUS - GENERAL
DAILY ACTIVITIY SCORE: 24
MOBILITY SCORE: 24
SUGGESTED CMS G CODE MODIFIER DAILY ACTIVITY: CH
SUGGESTED CMS G CODE MODIFIER MOBILITY: CH

## 2019-02-04 ASSESSMENT — LIFESTYLE VARIABLES
DO YOU DRINK ALCOHOL: NO
SUBSTANCE_ABUSE: 1
SUBSTANCE_ABUSE: 0

## 2019-02-04 NOTE — PROGRESS NOTES
Pt woke up to get standing weight and answer orientation questions but would not other wise would not remain awake to participate in care. Boyfriend is at bedside and assisting with care but aware of our policy for no over night visitors in our semi private rooms. Call bell in reach and attempted education on safety measures but pt would not verbalize understanding. Will continue to monitor this shift.

## 2019-02-04 NOTE — PROGRESS NOTES
"Patient wanted to smoke. Explained to patient that this is a smoke free building and due to tele patient isn't allowed off floor. Patient stated \"I'm a grown ass woman I can smoke when the fuck I want to\" Explained to patient can get her a nicotine patch and gum to help cravings. Patient stated \"I don't want that I want to smoke, if you don't let me smoke I'm going to pull out my IV and go smoke. Explained to patient I will let the MD know her request. Spoke to Dr. Baker and told him what patient says. MD to floor and tried to explain the policy and that patient is sick and need IV antibiotics but patient became hysterical and screaming. MD stated if patient wants to go AMA then let her sign the papers. AMA paper signed, PIV removed with no s/s of bleeding or infection at site. Patient left AMA.  "

## 2019-02-04 NOTE — CARE PLAN
Problem: Fluid Volume:  Goal: Will maintain balanced intake and output  Outcome: PROGRESSING AS EXPECTED  Pt will have adequate output this shift, attempted education on I&O but would not verbalize understanding     Problem: Mobility  Goal: Risk for activity intolerance will decrease  Outcome: PROGRESSING AS EXPECTED  Pt will ambulate as tolerated

## 2019-02-04 NOTE — ED NOTES
Med rec completed per historical- pt was admitted on 2/1 and med rec was completed on 2/2 by Cadee.

## 2019-02-04 NOTE — ED TRIAGE NOTES
Chief Complaint   Patient presents with   • Flank Pain     bilateral     Pt was admitted to hospital for sepsis and pyelonephritis.  Pt signed out AMA 1 hour ago and is now having increased pain.  Triage process explained to patient.  Pt back to waiting room.  Pt instructed to inform RN if any changes or questions arise.

## 2019-02-04 NOTE — ED PROVIDER NOTES
ED Provider Note    CHIEF COMPLAINT  Chief Complaint   Patient presents with   • Flank Pain     bilateral       HPI  Vidhya Abdullahi is a 23 y.o. female who presents with abdominal pain.  The patient left the hospital AGAINST MEDICAL ADVICE approximately an hour prior to returning back to the emerge department for readmission.  The patient left as she states they would not let her go smoke.  The patient does return back to the emerge department with continued severe abdominal pain.  She also has nausea.  Patient is admitted to the hospital for IV antibiotics for pyelonephritis as well as sepsis.    REVIEW OF SYSTEMS  See HPI for further details. All other systems are negative.     PAST MEDICAL HISTORY  Past Medical History:   Diagnosis Date   • Depression    • Drug abuse (HCC)    • No known health problems    • Ovarian cyst    • Smoker    • UTI (urinary tract infection)        FAMILY HISTORY  [unfilled]    SOCIAL HISTORY  Social History     Social History   • Marital status: Single     Spouse name: N/A   • Number of children: N/A   • Years of education: N/A     Social History Main Topics   • Smoking status: Current Every Day Smoker     Packs/day: 0.50     Types: Cigarettes   • Smokeless tobacco: Never Used   • Alcohol use No   • Drug use: Yes     Types: Intravenous      Comment: meth, last use yesterday   • Sexual activity: Not on file     Other Topics Concern   • Not on file     Social History Narrative   • No narrative on file       SURGICAL HISTORY  Past Surgical History:   Procedure Laterality Date   • EXAM UNDER ANESTHESIA  10/27/2018    Procedure: EXAM UNDER ANESTHESIA;  Surgeon: Jahaira Butterfield M.D.;  Location: [facility];  Service: Obstetrics   • SKIN ABSCESS INCISION AND DRAINAGE  10/27/2018    Procedure: SKIN ABSCESS INCISION AND DRAINAGE AND WORD CATHETER PLACEMENT;  Surgeon: Jahaira Butterfield M.D.;  Location: [facility];  Service: Obstetrics   • TONSILLECTOMY    "      CURRENT MEDICATIONS  Home Medications    **Home medications have not yet been reviewed for this encounter**         ALLERGIES  No Known Allergies    PHYSICAL EXAM  VITAL SIGNS: /89   Pulse (!) 112   Temp 36.6 °C (97.9 °F) (Temporal)   Resp 17   Ht 1.575 m (5' 2\")   Wt 67.4 kg (148 lb 9.4 oz)   SpO2 98%   BMI 27.18 kg/m²       Constitutional: in acute distress, Non-toxic appearance.   HENT: Normocephalic, Atraumatic, Bilateral external ears normal, Oropharynx moist, No oral exudates, Nose normal.   Eyes: PERRLA, EOMI, Conjunctiva normal, No discharge.   Neck: Normal range of motion, No tenderness, Supple, No stridor.   Lymphatic: No lymphadenopathy noted.   Cardiovascular: Tachycardic heart rate, Normal rhythm, No murmurs, No rubs, No gallops.   Thorax & Lungs: Normal breath sounds, No respiratory distress, No wheezing, No chest tenderness.   Abdomen: Diffuse tenderness to palpation, hypoactive bowel sounds, slight distention  Skin: Warm, Dry, No erythema, No rash.   Back: Left CVA tenderness.   Extremities: Intact distal pulses, No edema, No tenderness, No cyanosis, No clubbing.   Musculoskeletal: Good range of motion in all major joints. No tenderness to palpation or major deformities noted.   Neurologic: Alert & oriented x 3, Normal motor function, Normal sensory function, No focal deficits noted.   Psychiatric: Affect normal, Judgment normal, Mood normal.     Differential diagnosis includes viral gastroenteritis, pancreatitis, cholecystitis, appendicitis, and pyelonephritis  COURSE & MEDICAL DECISION MAKING  Pertinent Labs & Imaging studies reviewed. (See chart for details)  This a 23-year-old female who presents back to the emerge department asking for readmission.  I did review the patient's records and she was discharged AGAINST MEDICAL ADVICE and she was noncompliant on the floor with allowing blood draws as well as repeat urinalysis.  I had a long discussion with the patient.  She agrees " to be compliant if she is readmitted back to the hospital.  Therefore we will contact the hospitalist for readmission.    FINAL IMPRESSION  1.  Pyelonephritis    Disposition  The patient will be admitted in stable condition    Electronically signed by: Jamison Briones, 2/4/2019 3:05 PM

## 2019-02-04 NOTE — H&P
Hospital Medicine History & Physical Note    Date of Service  2/4/2019    Primary Care Physician  No primary care provider on file.    Consultants  none    Code Status  Full    Chief Complaint  Right flank pain    History of Presenting Illness  23 y.o. female who presented 2/4/2019 with past medical history of methamphetamine abuse was recently hospitalized for right-sided pyelonephritis.  He was admitted on February 1st and patient signed out AGAINST MEDICAL ADVICE on February 4.  She was out of the hospital for a few hours and then returned because of the right flank pain was intolerable.  She has right flank pain ,intensity 8 out of 10, sharp pain, constant, worse with movement.  The pain is improved by IV morphine. repeat labs done in the emergency department shows that the patient is no longer septic      Urine culture shows E. coli sensitive to all antibiotic    She has 1  out of 2 cultures that was positive for this ecoli    Patient was very agitated when she arrived on the medical floor    Review of Systems  Review of Systems   Constitutional: Positive for malaise/fatigue. Negative for chills, fever and weight loss.   HENT: Negative for ear discharge, ear pain, hearing loss and tinnitus.    Eyes: Negative for blurred vision and double vision.   Respiratory: Negative for cough, hemoptysis and sputum production.    Cardiovascular: Negative for chest pain, palpitations and claudication.   Gastrointestinal: Positive for nausea. Negative for vomiting.   Genitourinary: Positive for flank pain and frequency. Negative for dysuria and urgency.   Musculoskeletal: Positive for myalgias. Negative for back pain and neck pain.   Skin: Negative for itching and rash.   Neurological: Negative for dizziness, tingling, tremors and headaches.   Psychiatric/Behavioral: Negative for hallucinations, substance abuse and suicidal ideas.       Past Medical History   has a past medical history of Depression; Drug abuse (HCC); No known  health problems; Ovarian cyst; Smoker; and UTI (urinary tract infection).    Surgical History   has a past surgical history that includes tonsillectomy; exam under anesthesia (10/27/2018); and skin abscess incision and drainage (10/27/2018).     Family History  family history includes Kidney Disease in her maternal grandmother and mother; No Known Problems in her father.     Social History   reports that she has been smoking Cigarettes.  She has been smoking about 0.50 packs per day. She has never used smokeless tobacco. She reports that she uses drugs, including Intravenous. She reports that she does not drink alcohol.    Allergies  No Known Allergies    Medications  Prior to Admission Medications   Prescriptions Last Dose Informant Patient Reported? Taking?   acetaminophen (TYLENOL) 325 MG Tab  Patient Yes No   Sig: Take 650 mg by mouth every four hours as needed for Mild Pain.      Facility-Administered Medications: None       Physical Exam  Temp:  [36.6 °C (97.9 °F)] 36.6 °C (97.9 °F)  Pulse:  [101-112] 101  Resp:  [17-18] 18  BP: (116)/(89) 116/89  SpO2:  [94 %-98 %] 94 %    Physical Exam   Constitutional: She is oriented to person, place, and time. She appears well-developed and well-nourished. She appears distressed.   HENT:   Head: Normocephalic and atraumatic.   Mouth/Throat: No oropharyngeal exudate.   Eyes: Pupils are equal, round, and reactive to light. EOM are normal. Left eye exhibits no discharge. No scleral icterus.   Neck: No JVD present. No tracheal deviation present. No thyromegaly present.   Cardiovascular: Regular rhythm.  Exam reveals no gallop and no friction rub.    No murmur heard.  Tachycardia   Pulmonary/Chest: Effort normal and breath sounds normal. No respiratory distress. She has no wheezes.   Abdominal: Soft. Bowel sounds are normal. She exhibits no mass. There is tenderness (Right flank). There is no rebound and no guarding.   Musculoskeletal: Normal range of motion. She exhibits  deformity. She exhibits no edema.   Neurological: She is alert and oriented to person, place, and time. No cranial nerve deficit. Coordination normal.   Skin: No rash noted. No erythema.   Psychiatric:   Agitated       Laboratory:  Recent Labs      02/01/19 2149 02/02/19 1724 02/03/19 0228   WBC  16.1*  10.2  11.6*   RBC  4.25  3.68*  3.62*   HEMOGLOBIN  13.3  11.6*  11.4*   HEMATOCRIT  39.3  35.9*  33.7*   MCV  92.5  97.6  93.1   MCH  31.3  31.5  31.5   MCHC  33.8  32.3*  33.8   RDW  41.3  45.1  42.3   PLATELETCT  189  109*  138*   MPV  9.7  9.5  9.7     Recent Labs      02/01/19 2149 02/02/19 1724 02/03/19 0228   SODIUM  135  136  136   POTASSIUM  3.7  3.9  3.6   CHLORIDE  105  111  110   CO2  20  18*  18*   GLUCOSE  91  103*  112*   BUN  10  8  6*   CREATININE  0.73  0.63  0.52   CALCIUM  8.4*  7.6*  7.6*     Recent Labs      02/01/19 2149 02/02/19 1724 02/03/19 0228   ALTSGPT  11   --    --    ASTSGOT  13   --    --    ALKPHOSPHAT  55   --    --    TBILIRUBIN  0.7   --    --    GLUCOSE  91  103*  112*                 Recent Labs      02/02/19 1724 02/02/19 2008   TROPONINI  0.12*  0.15*       Urinalysis:    Recent Labs      02/01/19 2020   SPECGRAVITY  1.010  1.010   GLUCOSEUR  Negative   KETONES  15*   NITRITE  Positive*   LEUKESTERAS  Trace*   WBCURINE  20-50*   RBCURINE  5-10*   BACTERIA  Many*   EPITHELCELL  Negative        Imaging:  No orders to display     Results for JIA BRIDGER DUKES (MRN [id number]) as of 2/4/2019 15:34   Ref. Range 2/1/2019 20:20 2/1/2019 20:20   Color Unknown Yellow    Character Unknown Clear    Specific Gravity Unknown 1.010 1.010   Ph Latest Ref Range: 5.0 - 8.0  7.0    Glucose Latest Ref Range: Negative mg/dL Negative    Ketones Latest Ref Range: Negative mg/dL 15 (A)    Bilirubin Latest Ref Range: Negative  Negative    Occult Blood Latest Ref Range: Negative  Small (A)    Protein Latest Ref Range: Negative mg/dL Negative    Nitrite Latest Ref Range:  Negative  Positive (A)    Leukocyte Esterase Latest Ref Range: Negative  Trace (A)    Urobilinogen, Urine Latest Ref Range: Negative  0.2    Micro Urine Req Unknown Microscopic    WBC Latest Units: /hpf 20-50 (A)    RBC Latest Units: /hpf 5-10 (A)    Epithelial Cells Latest Units: /hpf Negative    Bacteria Latest Ref Range: None /hpf Many (A)    Hyaline Cast Latest Units: /lpf 0-2      -----------------------    Significant Indicator   URINE CULTURE(NEW)   Collected: 19   Resulting lab: [facility]   Value: POS (Positive)   Comment:    [address] [place] [id number] Vidhya Abdullahi  MRN: [id number], : 1995, Sex: F  Adm: 2019, D/C: 2019   Order    URINE CULTURE(NEW) [[id number]] (Order [id number]) - Reflex for Order [id number]   View The Box     URINE CULTURE(NEW) (Order #[id number]) on 19        URINE CULTURE(NEW) (Order [id number]) - Reflex for Order [id number]   Component Results     Component   Significant Indicator  (Positive)   POS (POS)    Source   UR    Site   -    Urine Culture  (Abnormal)   -     Urine Culture  (Abnormal)      Escherichia coli   >100,000 cfu/mL          URINE MICROSCOPIC (W/UA) (Order [id number]) - Reflex for Order [id number]   Component Results     Component Value Ref Range & Units Status   WBC 20-50   /hpf Final   Comment:   Female   <12 Yr 0-2   >12 Yr 0-5   Male   None    RBC 5-10   /hpf Final   Comment:   Female   >12 Yr 0-2   Male   None    Bacteria Many   None /hpf Final   Epithelial Cells Negative  /hpf Final   Hyaline Cast 0-2  /lpf Final        URINALYSIS,CULTURE IF INDICATED (Order [id number])   Component Results     Component Value Ref Range & Units Status   Color Yellow   Final   Character Clear   Final   Specific Gravity 1.010  <1.035 Final   Ph 7.0  5.0 - 8.0 Final   Glucose Negative  Negative mg/dL Final   Ketones 15   Negative mg/dL Final   Protein Negative  Negative mg/dL Final    Bilirubin Negative  Negative Final   Urobilinogen, Urine 0.2  Negative Final   Nitrite Positive   Negative Final   Leukocyte Esterase Trace   Negative Final   Occult Blood Small   Negative Final   Micro Urine Req Microscopic   Final        URINE CULTURE(NEW) (Order [id number]) - Reflex for Order [id number]        URINE MICROSCOPIC (W/UA) (Order [id number]) - Reflex for Order [id number]        URINALYSIS,CULTURE IF INDICATED (Order [id number])   Lab Information     Lab   [facility]              Last Resulted Time   Sun Feb 3, 2019  9:19 AM   Detailed Information     Priority and Order Details Collection Information          Collection Information     Collected: 2/1/2019  8:20 PM    Resulting Agency: [facility]           URINE CULTURE(NEW) (Order [id number]) - Reflex for Order [id number]   Culture & Susceptibility     ESCHERICHIA COLI     Antibiotic Sensitivity Microscan Unit Status   Ampicillin Sensitive <=8 mcg/mL Final   Cefepime Sensitive <=8 mcg/mL Final   Cefotaxime Sensitive <=2 mcg/mL Final   Cefotetan Sensitive <=16 mcg/mL Final   Ceftazidime Sensitive <=1 mcg/mL Final   Ceftriaxone Sensitive <=8 mcg/mL Final   Cefuroxime Sensitive <=4 mcg/mL Final   Cephalothin Sensitive <=8 mcg/mL Final   Ciprofloxacin Sensitive <=1 mcg/mL Final   Gentamicin Sensitive <=4 mcg/mL Final   Levofloxacin Sensitive <=2 mcg/mL Final   Nitrofurantoin Sensitive <=32 mcg/mL Final   Pip/Tazobactam Sensitive <=16 mcg/mL Final   Piperacillin Sensitive <=16 mcg/mL Final   Tigecycline Sensitive <=2 mcg/mL Final   Tobramycin Sensitive <=4 mcg/mL Final   Trimeth/Sulfa Sensitive             Assessment/Plan:  I anticipate this patient will require at least two midnights for appropriate medical management, necessitating inpatient admission.    * Pyelonephritis- (present on admission)   Assessment & Plan    Due to E. Coli    Patient is not septic      IV Rocephin started    IV Toradol for pain    IV morphine  for    P.o. oxycodone for pain     Agitation- (present on admission)   Assessment & Plan    Psych consult called    Case d/w dr christensen     Tobacco dependence- (present on admission)   Assessment & Plan    Nicotine patch    Smoking cessation advised     Bacteremia due to E. coli- (present on admission)   Assessment & Plan    IV Rocephin    Would recommend a completion of 2 weeks of therapy total of p.o. or IV abx     Methamphetamine abuse (HCC)- (present on admission)   Assessment & Plan    Patient advised to stay away from illicit drug         VTE prophylaxis: scd

## 2019-02-04 NOTE — DISCHARGE SUMMARY
Discharge Summary    CHIEF COMPLAINT ON ADMISSION  Chief Complaint   Patient presents with   • Flank Pain   • Painful Urination       Reason for Admission  Back Pain     Admission Date  2/1/2019    CODE STATUS  Full    HPI & HOSPITAL COURSE  This is a 23 y.o. female here with right flank pain    23 yr old female with a PMH of IV meth abuse presented with right flank pain and was noted to be septic secondary to pyelonephritis.  The patient was started on IV fluids and IV ceftriaxone. Her blood culture was positive for e Coli. The patient was frequently refusing labs and treatment. She was very disrespectful to staff and would often scream at staff. The patient said she wanted to smoke cigarettes and I informed her that it is against our Hospital's policy to allow patient's to smoke here. I offered nicotine patch however the patient refused. She started yelling and said she was leaving. I explained the risks of worsening infection and possibly death if she decided to leave against medical advice however she decided to leave any way.     Therefore, she is discharged in guarded and stable condition against medcial advice.        Discharge Date  2/4/2019        DISCHARGE DIAGNOSES  Principal Problem:    Pyelonephritis POA: Yes  Active Problems:    Sepsis (HCC) POA: Yes    Elevated troponin POA: Yes    Methamphetamine abuse (HCC) POA: Yes    Bacteremia POA: Yes  Resolved Problems:    * No resolved hospital problems. *      FOLLOW UP  No future appointments.  No follow-up provider specified.    MEDICATIONS ON DISCHARGE     Medication List      ASK your doctor about these medications      Instructions   acetaminophen 325 MG Tabs  Commonly known as:  TYLENOL   Take 650 mg by mouth every four hours as needed for Mild Pain.  Dose:  650 mg            Allergies  No Known Allergies    DIET  No orders of the defined types were placed in this encounter.      LABORATORY  Lab Results   Component Value Date    SODIUM 136 02/03/2019     POTASSIUM 3.6 02/03/2019    CHLORIDE 110 02/03/2019    CO2 18 (L) 02/03/2019    GLUCOSE 112 (H) 02/03/2019    BUN 6 (L) 02/03/2019    CREATININE 0.52 02/03/2019        Lab Results   Component Value Date    WBC 11.6 (H) 02/03/2019    HEMOGLOBIN 11.4 (L) 02/03/2019    HEMATOCRIT 33.7 (L) 02/03/2019    PLATELETCT 138 (L) 02/03/2019        Total time of the discharge process exceeds 31 minutes.

## 2019-02-04 NOTE — CARE PLAN
Problem: Safety  Goal: Will remain free from falls  Outcome: PROGRESSING AS EXPECTED  Bed in lowest position with call light and bedside table within reach. Instructed patient to use call light for assistance,verbalized understanding. Bed alarm on for safety.

## 2019-02-04 NOTE — ASSESSMENT & PLAN NOTE
Due to E. Coli    Patient is not septic      IV Rocephin started    IV Toradol for pain    IV morphine for    P.o. oxycodone for pain

## 2019-02-05 ENCOUNTER — PATIENT OUTREACH (OUTPATIENT)
Dept: HEALTH INFORMATION MANAGEMENT | Facility: OTHER | Age: 24
End: 2019-02-05

## 2019-02-05 ENCOUNTER — HOSPITAL ENCOUNTER (EMERGENCY)
Facility: MEDICAL CENTER | Age: 24
End: 2019-02-05
Attending: EMERGENCY MEDICINE
Payer: COMMERCIAL

## 2019-02-05 VITALS
HEIGHT: 60 IN | RESPIRATION RATE: 18 BRPM | OXYGEN SATURATION: 98 % | TEMPERATURE: 98.2 F | HEART RATE: 106 BPM | WEIGHT: 150 LBS | BODY MASS INDEX: 29.45 KG/M2 | SYSTOLIC BLOOD PRESSURE: 108 MMHG | DIASTOLIC BLOOD PRESSURE: 78 MMHG

## 2019-02-05 VITALS
HEART RATE: 73 BPM | RESPIRATION RATE: 18 BRPM | BODY MASS INDEX: 27.34 KG/M2 | TEMPERATURE: 97.3 F | WEIGHT: 148.59 LBS | DIASTOLIC BLOOD PRESSURE: 60 MMHG | SYSTOLIC BLOOD PRESSURE: 85 MMHG | OXYGEN SATURATION: 94 % | HEIGHT: 62 IN

## 2019-02-05 DIAGNOSIS — R10.9 FLANK PAIN: ICD-10-CM

## 2019-02-05 LAB
ALBUMIN SERPL BCP-MCNC: 4.2 G/DL (ref 3.2–4.9)
ALBUMIN/GLOB SERPL: 1.3 G/DL
ALP SERPL-CCNC: 88 U/L (ref 30–99)
ALT SERPL-CCNC: 59 U/L (ref 2–50)
ANION GAP SERPL CALC-SCNC: 10 MMOL/L (ref 0–11.9)
ANION GAP SERPL CALC-SCNC: 8 MMOL/L (ref 0–11.9)
ANISOCYTOSIS BLD QL SMEAR: NORMAL
AST SERPL-CCNC: 47 U/L (ref 12–45)
BASOPHILS # BLD AUTO: 0 % (ref 0–1.8)
BASOPHILS # BLD AUTO: 0.3 % (ref 0–1.8)
BASOPHILS # BLD: 0 K/UL (ref 0–0.12)
BASOPHILS # BLD: 0.02 K/UL (ref 0–0.12)
BILIRUB SERPL-MCNC: 0.2 MG/DL (ref 0.1–1.5)
BLOOD CULTURE HOLD CXBCH: NORMAL
BUN SERPL-MCNC: 8 MG/DL (ref 8–22)
BUN SERPL-MCNC: 9 MG/DL (ref 8–22)
CALCIUM SERPL-MCNC: 8.5 MG/DL (ref 8.5–10.5)
CALCIUM SERPL-MCNC: 9.2 MG/DL (ref 8.5–10.5)
CHLORIDE SERPL-SCNC: 103 MMOL/L (ref 96–112)
CHLORIDE SERPL-SCNC: 105 MMOL/L (ref 96–112)
CO2 SERPL-SCNC: 23 MMOL/L (ref 20–33)
CO2 SERPL-SCNC: 26 MMOL/L (ref 20–33)
CREAT SERPL-MCNC: 0.58 MG/DL (ref 0.5–1.4)
CREAT SERPL-MCNC: 0.66 MG/DL (ref 0.5–1.4)
EOSINOPHIL # BLD AUTO: 0.05 K/UL (ref 0–0.51)
EOSINOPHIL # BLD AUTO: 0.19 K/UL (ref 0–0.51)
EOSINOPHIL NFR BLD: 0.9 % (ref 0–6.9)
EOSINOPHIL NFR BLD: 3.1 % (ref 0–6.9)
ERYTHROCYTE [DISTWIDTH] IN BLOOD BY AUTOMATED COUNT: 40.7 FL (ref 35.9–50)
ERYTHROCYTE [DISTWIDTH] IN BLOOD BY AUTOMATED COUNT: 40.7 FL (ref 35.9–50)
GLOBULIN SER CALC-MCNC: 3.3 G/DL (ref 1.9–3.5)
GLUCOSE SERPL-MCNC: 95 MG/DL (ref 65–99)
GLUCOSE SERPL-MCNC: 98 MG/DL (ref 65–99)
HCT VFR BLD AUTO: 38.7 % (ref 37–47)
HCT VFR BLD AUTO: 39.8 % (ref 37–47)
HGB BLD-MCNC: 12.9 G/DL (ref 12–16)
HGB BLD-MCNC: 13.6 G/DL (ref 12–16)
IMM GRANULOCYTES # BLD AUTO: 0.01 K/UL (ref 0–0.11)
IMM GRANULOCYTES NFR BLD AUTO: 0.2 % (ref 0–0.9)
LYMPHOCYTES # BLD AUTO: 1.78 K/UL (ref 1–4.8)
LYMPHOCYTES # BLD AUTO: 2.14 K/UL (ref 1–4.8)
LYMPHOCYTES NFR BLD: 29.1 % (ref 22–41)
LYMPHOCYTES NFR BLD: 38.9 % (ref 22–41)
MANUAL DIFF BLD: NORMAL
MCH RBC QN AUTO: 30.7 PG (ref 27–33)
MCH RBC QN AUTO: 31.1 PG (ref 27–33)
MCHC RBC AUTO-ENTMCNC: 33.3 G/DL (ref 33.6–35)
MCHC RBC AUTO-ENTMCNC: 34.2 G/DL (ref 33.6–35)
MCV RBC AUTO: 91.1 FL (ref 81.4–97.8)
MCV RBC AUTO: 92.1 FL (ref 81.4–97.8)
MICROCYTES BLD QL SMEAR: NORMAL
MONOCYTES # BLD AUTO: 0.44 K/UL (ref 0–0.85)
MONOCYTES # BLD AUTO: 0.5 K/UL (ref 0–0.85)
MONOCYTES NFR BLD AUTO: 8 % (ref 0–13.4)
MONOCYTES NFR BLD AUTO: 8.2 % (ref 0–13.4)
MORPHOLOGY BLD-IMP: NORMAL
MYELOCYTES NFR BLD MANUAL: 0.9 %
NEUTROPHILS # BLD AUTO: 2.82 K/UL (ref 2–7.15)
NEUTROPHILS # BLD AUTO: 3.62 K/UL (ref 2–7.15)
NEUTROPHILS NFR BLD: 51.3 % (ref 44–72)
NEUTROPHILS NFR BLD: 59.1 % (ref 44–72)
NRBC # BLD AUTO: 0 K/UL
NRBC # BLD AUTO: 0 K/UL
NRBC BLD-RTO: 0 /100 WBC
NRBC BLD-RTO: 0 /100 WBC
PLATELET # BLD AUTO: 200 K/UL (ref 164–446)
PLATELET # BLD AUTO: 230 K/UL (ref 164–446)
PLATELET BLD QL SMEAR: NORMAL
PMV BLD AUTO: 9.6 FL (ref 9–12.9)
PMV BLD AUTO: 9.7 FL (ref 9–12.9)
POTASSIUM SERPL-SCNC: 3.7 MMOL/L (ref 3.6–5.5)
POTASSIUM SERPL-SCNC: 4.4 MMOL/L (ref 3.6–5.5)
PROT SERPL-MCNC: 7.5 G/DL (ref 6–8.2)
RBC # BLD AUTO: 4.2 M/UL (ref 4.2–5.4)
RBC # BLD AUTO: 4.37 M/UL (ref 4.2–5.4)
RBC BLD AUTO: PRESENT
SODIUM SERPL-SCNC: 136 MMOL/L (ref 135–145)
SODIUM SERPL-SCNC: 139 MMOL/L (ref 135–145)
WBC # BLD AUTO: 5.5 K/UL (ref 4.8–10.8)
WBC # BLD AUTO: 6.1 K/UL (ref 4.8–10.8)

## 2019-02-05 PROCEDURE — 36415 COLL VENOUS BLD VENIPUNCTURE: CPT

## 2019-02-05 PROCEDURE — A9270 NON-COVERED ITEM OR SERVICE: HCPCS | Performed by: EMERGENCY MEDICINE

## 2019-02-05 PROCEDURE — 700105 HCHG RX REV CODE 258: Performed by: EMERGENCY MEDICINE

## 2019-02-05 PROCEDURE — 99239 HOSP IP/OBS DSCHRG MGMT >30: CPT | Performed by: FAMILY MEDICINE

## 2019-02-05 PROCEDURE — 85027 COMPLETE CBC AUTOMATED: CPT

## 2019-02-05 PROCEDURE — 700111 HCHG RX REV CODE 636 W/ 250 OVERRIDE (IP): Performed by: EMERGENCY MEDICINE

## 2019-02-05 PROCEDURE — 96365 THER/PROPH/DIAG IV INF INIT: CPT

## 2019-02-05 PROCEDURE — 85025 COMPLETE CBC W/AUTO DIFF WBC: CPT

## 2019-02-05 PROCEDURE — 96375 TX/PRO/DX INJ NEW DRUG ADDON: CPT

## 2019-02-05 PROCEDURE — 700105 HCHG RX REV CODE 258: Performed by: HOSPITALIST

## 2019-02-05 PROCEDURE — 85007 BL SMEAR W/DIFF WBC COUNT: CPT

## 2019-02-05 PROCEDURE — 700102 HCHG RX REV CODE 250 W/ 637 OVERRIDE(OP): Performed by: HOSPITALIST

## 2019-02-05 PROCEDURE — A9270 NON-COVERED ITEM OR SERVICE: HCPCS | Performed by: HOSPITALIST

## 2019-02-05 PROCEDURE — 80053 COMPREHEN METABOLIC PANEL: CPT

## 2019-02-05 PROCEDURE — 80048 BASIC METABOLIC PNL TOTAL CA: CPT

## 2019-02-05 PROCEDURE — 99285 EMERGENCY DEPT VISIT HI MDM: CPT

## 2019-02-05 PROCEDURE — 700102 HCHG RX REV CODE 250 W/ 637 OVERRIDE(OP): Performed by: EMERGENCY MEDICINE

## 2019-02-05 PROCEDURE — 700111 HCHG RX REV CODE 636 W/ 250 OVERRIDE (IP): Performed by: HOSPITALIST

## 2019-02-05 RX ORDER — SODIUM CHLORIDE 9 MG/ML
1000 INJECTION, SOLUTION INTRAVENOUS ONCE
Status: COMPLETED | OUTPATIENT
Start: 2019-02-05 | End: 2019-02-05

## 2019-02-05 RX ORDER — ONDANSETRON 2 MG/ML
4 INJECTION INTRAMUSCULAR; INTRAVENOUS ONCE
Status: COMPLETED | OUTPATIENT
Start: 2019-02-05 | End: 2019-02-05

## 2019-02-05 RX ORDER — MORPHINE SULFATE 4 MG/ML
4 INJECTION, SOLUTION INTRAMUSCULAR; INTRAVENOUS ONCE
Status: COMPLETED | OUTPATIENT
Start: 2019-02-05 | End: 2019-02-05

## 2019-02-05 RX ORDER — CEFDINIR 300 MG/1
300 CAPSULE ORAL 2 TIMES DAILY
Qty: 16 CAP | Refills: 0 | Status: SHIPPED | OUTPATIENT
Start: 2019-02-06 | End: 2019-02-14

## 2019-02-05 RX ORDER — KETOROLAC TROMETHAMINE 30 MG/ML
30 INJECTION, SOLUTION INTRAMUSCULAR; INTRAVENOUS ONCE
Status: COMPLETED | OUTPATIENT
Start: 2019-02-05 | End: 2019-02-05

## 2019-02-05 RX ORDER — ACETAMINOPHEN 325 MG/1
650 TABLET ORAL ONCE
Status: COMPLETED | OUTPATIENT
Start: 2019-02-05 | End: 2019-02-05

## 2019-02-05 RX ADMIN — KETOROLAC TROMETHAMINE 30 MG: 30 INJECTION, SOLUTION INTRAMUSCULAR; INTRAVENOUS at 22:42

## 2019-02-05 RX ADMIN — ONDANSETRON 4 MG: 2 INJECTION INTRAMUSCULAR; INTRAVENOUS at 22:43

## 2019-02-05 RX ADMIN — NICOTINE 21 MG: 21 PATCH, EXTENDED RELEASE TRANSDERMAL at 05:52

## 2019-02-05 RX ADMIN — ACETAMINOPHEN 650 MG: 325 TABLET, FILM COATED ORAL at 22:42

## 2019-02-05 RX ADMIN — CEFTRIAXONE SODIUM 2 G: 2 INJECTION, POWDER, FOR SOLUTION INTRAMUSCULAR; INTRAVENOUS at 05:53

## 2019-02-05 RX ADMIN — MORPHINE SULFATE 4 MG: 4 INJECTION INTRAVENOUS at 22:43

## 2019-02-05 RX ADMIN — SODIUM CHLORIDE 1000 ML: 9 INJECTION, SOLUTION INTRAVENOUS at 22:43

## 2019-02-05 RX ADMIN — CEFTRIAXONE SODIUM 1 G: 1 INJECTION, POWDER, FOR SOLUTION INTRAMUSCULAR; INTRAVENOUS at 22:42

## 2019-02-05 RX ADMIN — MORPHINE SULFATE 2 MG: 4 INJECTION INTRAVENOUS at 08:06

## 2019-02-05 RX ADMIN — MORPHINE SULFATE 2 MG: 4 INJECTION INTRAVENOUS at 01:26

## 2019-02-05 NOTE — PSYCHIATRY
BRIEF PSYCHIATRIC CONSULT NOTE:     Pt seen briefly as she was agitated at bedside, screaming at nurse.   She     Pt with methamphetamine abuse, hx of group home treatment as a child.   She was intermittently agitated, screaming, cussing at nurses and then mute.   She didn't threaten to kill anyone and isn't stating she is suicidal.     Starting risperdal 1mg po qhs.   Haldol and ativan prn entered.     Please call psych if you need us to follow.

## 2019-02-05 NOTE — PROGRESS NOTES
· 2 RN skin check complete with ANTWAN Mendenhall.   · Devices in place PIV.  · Skin assessed under devices yes.  · Confirmed pressure ulcers found on NA.  · New potential pressure ulcers noted on NA.  · The following interventions are in place pt ambulatory, educated on fall precautions while on pain medications.

## 2019-02-05 NOTE — PROGRESS NOTES
Patient discharged home with antibiotic prescription and discharge and paerwork. Pt left behind  for cell phone.  placed in plastic bag with patient sticker on bag at nurses station.

## 2019-02-05 NOTE — PROGRESS NOTES
Hospital Medicine Daily Progress Note    Date of Service  2/4/2019    Chief Complaint  23 y.o. female admitted 2/1/2019 with right flank pain    Hospital Course    23 yr old female with a PMH of IV meth abuse presented with right flank pain and was noted to be septic secondary to pyelonephritis.       Interval Problem Update  2/2 Patient continues to be tachycardic at 125. Patient is report chest pain. I have ordered stat EKG, CXR, Troponin and Dimer. Bolus with 1 L of NS.   Patient is refusing blood draws. I explained the importance of performing these blood tests to rule out PE however she is adamant on not wanting any further blood draws. The patient denies any previous history of blood clots. She is not hypoxic at this time. Her tachycardia can be explained by sepsis and recent methamephetamine abuse. We will continue to monitor the patient closely.     2/3 Troponin elevated at 0.12>0.15. I believe this is demand ischemia in the setting of sepsis, tachycardia and methamphetamine toxicity. Ordered full dose of aspirin but patient refused. Blood cultures positive for gram negative rods. Continue IV Ceftriaxone. Patient is agitated and crying. She has been very difficult to manage per staff. I will order ativan PRN for agitation. She remains tachycardic, bolus with 1L of NS.     2/4 patient continues to be tachycardic.  Blood cultures positive for E. coli sensitive to ceftriaxone.  Repeat blood culture.  Likely source is pyelonephritis.  2D echo reveals no evidence of vegetations.    Consultants/Specialty  None    Code Status  Full Code    Disposition  TBD    Review of Systems  Review of Systems   Constitutional: Positive for chills and malaise/fatigue. Negative for diaphoresis and fever.   HENT: Negative for hearing loss and sore throat.    Eyes: Negative for blurred vision.   Respiratory: Negative for cough, sputum production, shortness of breath and wheezing.    Cardiovascular: Positive for chest pain. Negative  for palpitations and leg swelling.   Gastrointestinal: Negative for abdominal pain, blood in stool, diarrhea, nausea and vomiting.   Genitourinary: Positive for flank pain. Negative for dysuria and urgency.   Musculoskeletal: Negative for back pain, joint pain, myalgias and neck pain.   Skin: Negative for rash.   Neurological: Negative for dizziness, focal weakness, seizures and headaches.   Endo/Heme/Allergies: Does not bruise/bleed easily.   Psychiatric/Behavioral: Positive for substance abuse. Negative for suicidal ideas. The patient is nervous/anxious.    All other systems reviewed and are negative.       Physical Exam  Temp:  [36.8 °C (98.2 °F)-37.2 °C (98.9 °F)] 36.9 °C (98.4 °F)  Pulse:  [] 104  Resp:  [18-20] 20  BP: ()/(56-80) 93/56  SpO2:  [93 %-96 %] 95 %    Physical Exam   Constitutional: She is oriented to person, place, and time. She appears well-developed and well-nourished. No distress.   HENT:   Head: Normocephalic and atraumatic.   Mouth/Throat: Oropharynx is clear and moist.   Eyes: Pupils are equal, round, and reactive to light. Conjunctivae are normal.   Neck: Neck supple. No thyromegaly present.   Cardiovascular: Regular rhythm and normal heart sounds.    tachycardic   Pulmonary/Chest: Effort normal and breath sounds normal. No respiratory distress. She has no wheezes. She has no rales.   Abdominal: Soft. Bowel sounds are normal. She exhibits no distension. There is no tenderness. There is no rebound.   Musculoskeletal: Normal range of motion. She exhibits no edema or tenderness.   Neurological: She is alert and oriented to person, place, and time. No cranial nerve deficit. Coordination normal.   Skin: Skin is warm and dry.   Psychiatric: Her mood appears anxious. Her affect is labile. She is agitated.   Nursing note and vitals reviewed.      Fluids    Intake/Output Summary (Last 24 hours) at 02/04/19 1816  Last data filed at 02/04/19 0710   Gross per 24 hour   Intake                 0 ml   Output             1300 ml   Net            -1300 ml       Laboratory  Recent Labs      02/01/19 2149 02/02/19 1724 02/03/19 0228   WBC  16.1*  10.2  11.6*   RBC  4.25  3.68*  3.62*   HEMOGLOBIN  13.3  11.6*  11.4*   HEMATOCRIT  39.3  35.9*  33.7*   MCV  92.5  97.6  93.1   MCH  31.3  31.5  31.5   MCHC  33.8  32.3*  33.8   RDW  41.3  45.1  42.3   PLATELETCT  189  109*  138*   MPV  9.7  9.5  9.7     Recent Labs      02/01/19 2149 02/02/19 1724 02/03/19 0228   SODIUM  135  136  136   POTASSIUM  3.7  3.9  3.6   CHLORIDE  105  111  110   CO2  20  18*  18*   GLUCOSE  91  103*  112*   BUN  10  8  6*   CREATININE  0.73  0.63  0.52   CALCIUM  8.4*  7.6*  7.6*                   Imaging  EC-ECHOCARDIOGRAM COMPLETE W/O CONT   Final Result      DX-CHEST-PORTABLE (1 VIEW)   Final Result      No acute cardiac or pulmonary abnormality is noted.      CT-RENAL COLIC EVALUATION(A/P W/O)   Final Result         1.  No acute abnormality.           Assessment/Plan  * Pyelonephritis- (present on admission)   Assessment & Plan    IV antibiotics with rocephin, follow culture results.   Pain control with oxycodone and IV morphine for breakthrough     Sepsis (HCC)- (present on admission)   Assessment & Plan    This is sepsis (without associated acute organ dysfunction).    Likely source is pyelonephritis  Patient has been started on IV fluids per septic protocol  Lactate is within normal limits  Patient is started on IV ceftriaxone  Follow blood and urine cultures       Elevated troponin- (present on admission)   Assessment & Plan    Likely secondary to demand ischemia in the setting of methamphetamine abuse, sepsis and tachycardia  Trend troponin  Ordered full dose of aspirin      Bacteremia due to E. coli- (present on admission)   Assessment & Plan    Blood culture positive for gram negative rods  Likely source is pyelonephritis  Check 2D echo to rule out vegetations  Continue IV Ceftriaxone         Methamphetamine  abuse (HCC)- (present on admission)   Assessment & Plan    Ongoing  Patient counseled on cessation  Ativan PRN for agitation           VTE prophylaxis: lovenox

## 2019-02-05 NOTE — PROGRESS NOTES
"Pt arrived to the floor via gurney. Pt difficult to arouse, but once aroused able to ambulate to bed without problem. VSS, however pt requests blood pressures to be taken on right arm, as her left arm is too painful because \"there's a needle in it\". Pt has friends at bedside, mother is unable to make it due to the weather. Pt ordered a diet tray per request. Will monitor pt.   "

## 2019-02-05 NOTE — CARE PLAN
Problem: Safety  Goal: Will remain free from falls    Intervention: Assess risk factors for falls  Fall measures in place. Call light within reach, personal belongings close-by, bed in lowest position, IV pole on same side of bathroom, upper bed rails up, hourly rounding in place. Will continue to monitor pt for safety.       Problem: Pain Management  Goal: Pain level will decrease to patient's comfort goal    Intervention: Follow pain managment plan developed in collaboration with patient and Interdisciplinary Team  Pt medicated for pain per mar. Hourly rounding is in place.

## 2019-02-05 NOTE — DISCHARGE INSTRUCTIONS
Discharge Instructions    Discharged to home by car with self. Discharged via walking, hospital escort: Refused.  Special equipment needed: Not Applicable    Be sure to schedule a follow-up appointment with your primary care doctor or any specialists as instructed.     Discharge Plan:   Diet Plan: Discussed  Activity Level: Discussed  Confirmed Follow up Appointment: Patient to Call and Schedule Appointment  Confirmed Symptoms Management: Discussed  Medication Reconciliation Updated: Yes  Influenza Vaccine Indication: Patient Refuses    I understand that a diet low in cholesterol, fat, and sodium is recommended for good health. Unless I have been given specific instructions below for another diet, I accept this instruction as my diet prescription.   Other diet: Regular, as tolerated    Special Instructions: None    · Is patient discharged on Warfarin / Coumadin?   No       Pyelonephritis, Adult  Introduction  Pyelonephritis is a kidney infection. The kidneys are organs that help clean your blood by moving waste out of your blood and into your pee (urine). This infection can happen quickly, or it can last for a long time. In most cases, it clears up with treatment and does not cause other problems.  Follow these instructions at home:  Medicines  · Take over-the-counter and prescription medicines only as told by your doctor.  · Take your antibiotic medicine as told by your doctor. Do not stop taking the medicine even if you start to feel better.  General instructions  · Drink enough fluid to keep your pee clear or pale yellow.  · Avoid caffeine, tea, and carbonated drinks.  · Pee (urinate) often. Avoid holding in pee for long periods of time.  · Pee before and after sex.  · After pooping (having a bowel movement), women should wipe from front to back. Use each tissue only once.  · Keep all follow-up visits as told by your doctor. This is important.  Contact a doctor if:  · You do not feel better after 2 days.  · Your  symptoms get worse.  · You have a fever.  Get help right away if:  · You cannot take your medicine or drink fluids as told.  · You have chills and shaking.  · You throw up (vomit).  · You have very bad pain in your side (flank) or back.  · You feel very weak or you pass out (faint).  This information is not intended to replace advice given to you by your health care provider. Make sure you discuss any questions you have with your health care provider.  Document Released: 01/25/2006 Document Revised: 05/25/2017 Document Reviewed: 04/11/2016  © 2017 Elsevier      Depression / Suicide Risk    As you are discharged from this [facility] facility, it is important to learn how to keep safe from harming yourself.    Recognize the warning signs:  · Abrupt changes in personality, positive or negative- including increase in energy   · Giving away possessions  · Change in eating patterns- significant weight changes-  positive or negative  · Change in sleeping patterns- unable to sleep or sleeping all the time   · Unwillingness or inability to communicate  · Depression  · Unusual sadness, discouragement and loneliness  · Talk of wanting to die  · Neglect of personal appearance   · Rebelliousness- reckless behavior  · Withdrawal from people/activities they love  · Confusion- inability to concentrate     If you or a loved one observes any of these behaviors or has concerns about self-harm, here's what you can do:  · Talk about it- your feelings and reasons for harming yourself  · Remove any means that you might use to hurt yourself (examples: pills, rope, extension cords, firearm)  · Get professional help from the community (Mental Health, Substance Abuse, psychological counseling)  · Do not be alone:Call your Safe Contact- someone whom you trust who will be there for you.  · Call your local CRISIS HOTLINE 176-2868 or 733-402-1041  · Call your local Children's Mobile Crisis Response Team [place] (525) 939-8874 or  www.[employer]  · Call the toll free National Suicide Prevention Hotlines   · National Suicide Prevention Lifeline 524-180-EPAL (0208)  · National Hope Line Network 800-SUICIDE (704-8752)

## 2019-02-05 NOTE — PROGRESS NOTES
Received report from night RN, assumed care at 0700. Pt A&OX4, able to specify needs. Pt up self, maintains steady gait. Pt crying in pain this morning, medicated per MAR. Pt falls asleep while this RN gave IV pain medicine. During MD rounds patient denied pain. Fall precautions in place, call light in reach. POC discussed, communication board updated.

## 2019-02-05 NOTE — PROGRESS NOTES
Assumed care of pt after receiving report from dayshift RN. Bedside rounding completed w/ dayshift RN. Pt resting in bed A&OX4, pt very drowsy but arousable, VSS, discussed poc w/ pt. Fall measures in place, call light within reach, personal belongings nearby, bed in lowest position, and hourly rounding in place. Will continue to monitor pt.

## 2019-02-05 NOTE — CARE PLAN
Problem: Safety  Goal: Will remain free from falls    Intervention: Implement fall precautions  Bed alarm in use, bed in lowest and locked position, non-skid socks in place.      Problem: Knowledge Deficit  Goal: Knowledge of disease process/condition, treatment plan, diagnostic tests, and medications will improve    Intervention: Explain information regarding disease process/condition, treatment plan, diagnostic tests, and medications and document in education  Patient educated about side effects of narcotics, including dependence and constipation.

## 2019-02-06 NOTE — DISCHARGE PLANNING
Medical Social Work    Referral: Resources    Intervention: MSSHIVANI received a call from ERP requesting assistance with pt's RX's.  Per ERP pt states that she was unable to afford her antibiotic (Omnicef).  MSW met with pt at bedside.  Pt states that she never even went to the pharmacy to try to get her prescription.  Pt was advised that she will need to get her prescription in order to feel better; she reports understanding.  Pt was provided with CARE Chest information should she need assistance if her co-pay is too high.  Pt denies any further needs.  ERP updated.    Plan: Pt to D/C home.

## 2019-02-06 NOTE — ED NOTES
"Patient on call bell stating \"I can go home now, my mom sent me money to get the abx.\" Boyfriend at bedside. MD made aware. Cleared for discharge. All instructions provided- verbalizes understanding. IV access removed. Ambulated off unit with steady gait. All safety maintained.   "

## 2019-02-06 NOTE — ED PROVIDER NOTES
"ED Provider Note      CHIEF COMPLAINT  Chief Complaint   Patient presents with   • Flank Pain       HPI  Vidhya Abdullahi is a 23 y.o. female who presents with a chief complaint of flank pain it is on the right flank side.  Duration has been for half a day.  It is throbbing.  It radiates to the right hip.  Worse with movement.  Better with rest.  It was intermittent now it is constant.  There is no associated vomiting or diarrhea.  No dysuria.    Patient was recently signed out AGAINST MEDICAL ADVICE earlier today she is being treated for pyelonephritis.  Patient had positive E. coli in her blood.  Patient subsequently was treated for 4 days of IV antibiotics left.  She given oral antibiotics.  She states that she was unable to fill out the medications due to cost.  She states that she is on her father's insurance.    She states that when she was in the hospital she just got \"fed up\".  States that she wanted to smoke.    REVIEW OF SYSTEMS  General: No fever or chills.  Eyes: No eye discharge. No eye pain.  Ear nose throat: No sore throat or  trouble swallowing.  Pulmonary: No shortness of breath or cough.  Cardiovascular: No chest pain or chest pressure.  GI: No abdominal pain nausea or vomiting.  : No dysuria or hematuria  Dermatologic: No rashes. No abrasions.  Neurologic: No weakness or numbness.    All other systems are negative      PAST MEDICAL HISTORY  Past Medical History:   Diagnosis Date   • Depression    • Drug abuse (HCC)    • No known health problems    • Ovarian cyst    • Smoker    • UTI (urinary tract infection)        FAMILY HISTORY  Family History   Problem Relation Age of Onset   • Kidney Disease Mother    • No Known Problems Father    • Kidney Disease Maternal Grandmother        SOCIAL HISTORY  Social History     Social History   • Marital status: Single     Spouse name: N/A   • Number of children: N/A   • Years of education: N/A     Social History Main Topics   • Smoking status: Current Every " Day Smoker     Packs/day: 0.50     Types: Cigarettes   • Smokeless tobacco: Never Used   • Alcohol use No   • Drug use: Yes     Types: Intravenous      Comment: meth, last use 4 days ago   • Sexual activity: Not on file     Other Topics Concern   • Not on file     Social History Narrative   • No narrative on file       SURGICAL HISTORY  Past Surgical History:   Procedure Laterality Date   • EXAM UNDER ANESTHESIA  10/27/2018    Procedure: EXAM UNDER ANESTHESIA;  Surgeon: Jahaira Butterfield M.D.;  Location: SURGERY [facility];  Service: Obstetrics   • SKIN ABSCESS INCISION AND DRAINAGE  10/27/2018    Procedure: SKIN ABSCESS INCISION AND DRAINAGE AND WORD CATHETER PLACEMENT;  Surgeon: Jahaira Butterfield M.D.;  Location: SURGERY [facility];  Service: Obstetrics   • TONSILLECTOMY         CURRENT MEDICATIONS  Home Medications     Reviewed by Sussy Knight R.N. (Registered Nurse) on 02/05/19 at 2225  Med List Status: Not Addressed   Medication Last Dose Status   acetaminophen (TYLENOL) 325 MG Tab  Active   cefdinir (OMNICEF) 300 MG Cap  Active                 ALLERGIES  No Known Allergies    PHYSICAL EXAM  VITAL SIGNS: /78   Pulse (!) 106   Temp 36.8 °C (98.2 °F) (Temporal)   Resp 18   Ht 1.524 m (5')   Wt 68 kg (150 lb)   SpO2 98%   BMI 29.29 kg/m²   Constitutional: Well developed, Well nourished, No acute distress, Non-toxic appearance.   HENT: Normocephalic, Atraumatic, Bilateral external ears normal, Oropharynx moist, No oral exudates, Nose normal.   Eyes: PERRLA, EOMI, Conjunctiva normal, No discharge.   Musculoskeletal: Neck has normal range of motion, No tenderness, Supple.   Lymphatic: No cervical lymphadenopathy noted.   Cardiovascular: Normal heart rate, Normal rhythm, No murmurs, No rubs, No gallops.   Thorax & Lungs: Normal breath sounds, No respiratory distress, No wheezing, No chest tenderness.   Abdomen: Nondistended minimal right lower quadrant tenderness no rebound no  guarding  Skin: Warm, Dry, No erythema, No rash.   : Positive right-sided CVA tenderness.   Psychiatric: Calm, not anxious  Neurologic: Alert & oriented, moves all extremities equally    RADIOLOGY/PROCEDURES  Results for orders placed or performed during the hospital encounter of 02/05/19   COMP METABOLIC PANEL   Result Value Ref Range    Sodium 139 135 - 145 mmol/L    Potassium 4.4 3.6 - 5.5 mmol/L    Chloride 103 96 - 112 mmol/L    Co2 26 20 - 33 mmol/L    Anion Gap 10.0 0.0 - 11.9    Glucose 95 65 - 99 mg/dL    Bun 9 8 - 22 mg/dL    Creatinine 0.66 0.50 - 1.40 mg/dL    Calcium 9.2 8.5 - 10.5 mg/dL    AST(SGOT) 47 (H) 12 - 45 U/L    ALT(SGPT) 59 (H) 2 - 50 U/L    Alkaline Phosphatase 88 30 - 99 U/L    Total Bilirubin 0.2 0.1 - 1.5 mg/dL    Albumin 4.2 3.2 - 4.9 g/dL    Total Protein 7.5 6.0 - 8.2 g/dL    Globulin 3.3 1.9 - 3.5 g/dL    A-G Ratio 1.3 g/dL   CBC WITH DIFFERENTIAL   Result Value Ref Range    WBC 5.5 4.8 - 10.8 K/uL    RBC 4.37 4.20 - 5.40 M/uL    Hemoglobin 13.6 12.0 - 16.0 g/dL    Hematocrit 39.8 37.0 - 47.0 %    MCV 91.1 81.4 - 97.8 fL    MCH 31.1 27.0 - 33.0 pg    MCHC 34.2 33.6 - 35.0 g/dL    RDW 40.7 35.9 - 50.0 fL    Platelet Count 230 164 - 446 K/uL    MPV 9.6 9.0 - 12.9 fL    Neutrophils-Polys 51.30 44.00 - 72.00 %    Lymphocytes 38.90 22.00 - 41.00 %    Monocytes 8.00 0.00 - 13.40 %    Eosinophils 0.90 0.00 - 6.90 %    Basophils 0.00 0.00 - 1.80 %    Nucleated RBC 0.00 /100 WBC    Neutrophils (Absolute) 2.82 2.00 - 7.15 K/uL    Lymphs (Absolute) 2.14 1.00 - 4.80 K/uL    Monos (Absolute) 0.44 0.00 - 0.85 K/uL    Eos (Absolute) 0.05 0.00 - 0.51 K/uL    Baso (Absolute) 0.00 0.00 - 0.12 K/uL    NRBC (Absolute) 0.00 K/uL    Anisocytosis 1+     Microcytosis 1+    BLOOD CULTURE,HOLD   Result Value Ref Range    Blood Culture Hold Collected    ESTIMATED GFR   Result Value Ref Range    GFR If African American >60 >60 mL/min/1.73 m 2    GFR If Non African American >60 >60 mL/min/1.73 m 2    DIFFERENTIAL MANUAL   Result Value Ref Range    Myelocytes 0.90 %    Manual Diff Status PERFORMED    PERIPHERAL SMEAR REVIEW   Result Value Ref Range    Peripheral Smear Review see below    PLATELET ESTIMATE   Result Value Ref Range    Plt Estimation Normal    MORPHOLOGY   Result Value Ref Range    RBC Morphology Present         COURSE & MEDICAL DECISION MAKING  Pertinent Labs & Imaging studies reviewed. (See chart for details)  Patient is here with right-sided flank pain and lower abdominal pain which apparently has been typical of her symptoms.  At this point the patient does not appear toxic she is slightly tachycardic most likely due to her pain.  She does not appear septic.    Patient received IV fluids Rocephin Toradol then, morphine we will go ahead and reevaluate the patient expect to be discharged home we will have social work help her on the prescription.    Patient is here if she was at approximately 1140 inform the nurse that she is ready to go home.  Her mother apparently Y her money leave and had a  see here for this possible inability felt the prescription the patient has insurance patient confesses to socially that she actually did not fill out her medications at all.  Therefore it appears patient has insurance patient is able to get her medication she is chooses not to at this point with a negative white cell count vital signs that look good there is no indication to get this patient here any further.  She is discharged home    [address] [id number] [phone]  Schedule an appointment as soon as possible for a visit           FINAL IMPRESSION  1.  Flank pain  2.   3.      Electronically signed by: Ethan Mcdaniel, 2/5/2019 10:59 PM

## 2019-02-06 NOTE — DISCHARGE SUMMARY
Discharge Summary    CHIEF COMPLAINT ON ADMISSION  Chief Complaint   Patient presents with   • Flank Pain     bilateral       Reason for Admission  BODYACHES     Admission Date  2/4/2019    CODE STATUS  Prior    HPI & HOSPITAL COURSE  This is a 23 y.o. female here with pyelonephritis, she was recently admitted and left AGAINST MEDICAL ADVICE.  Her previous urine culture showed E. coli which was pansensitive.  She was placed on IV Rocephin, she responded well to antibiotics her flank pain at this point had resolved.  She is nonfebrile.  She states that she will comply with taking the full course of antibiotics on discharge.  She was agitated in the emergency room secondary to being unable to smoke.       Therefore, she is discharged in good and stable condition to home with close outpatient follow-up.    The patient recovered much more quickly than anticipated on admission.    Discharge Date  2/5/2019    FOLLOW UP ITEMS POST DISCHARGE  None    DISCHARGE DIAGNOSES  Principal Problem:    Pyelonephritis POA: Yes  Active Problems:    Methamphetamine abuse (HCC) POA: Yes    Bacteremia due to E. coli POA: Yes    Tobacco dependence POA: Yes    Agitation POA: Yes  Resolved Problems:    * No resolved hospital problems. *      FOLLOW UP  No future appointments.  [facility]  [phone]    PLEASE CALL TO ESTABLISH A NEW PCP. THANK YOU      MEDICATIONS ON DISCHARGE     Medication List      START taking these medications      Instructions   cefdinir 300 MG Caps  Start taking on:  2/6/2019  Commonly known as:  OMNICEF   Take 1 Cap by mouth 2 times a day for 8 days.  Dose:  300 mg        CONTINUE taking these medications      Instructions   acetaminophen 325 MG Tabs  Commonly known as:  TYLENOL   Take 650 mg by mouth every four hours as needed for Mild Pain.  Dose:  650 mg            Allergies  No Known Allergies    DIET  No orders of the defined types were placed in this encounter.      ACTIVITY  As  tolerated.  Weight bearing as tolerated    CONSULTATIONS  None    PROCEDURES  None    LABORATORY  Lab Results   Component Value Date    SODIUM 136 02/05/2019    POTASSIUM 3.7 02/05/2019    CHLORIDE 105 02/05/2019    CO2 23 02/05/2019    GLUCOSE 98 02/05/2019    BUN 8 02/05/2019    CREATININE 0.58 02/05/2019        Lab Results   Component Value Date    WBC 6.1 02/05/2019    HEMOGLOBIN 12.9 02/05/2019    HEMATOCRIT 38.7 02/05/2019    PLATELETCT 200 02/05/2019        Total time of the discharge process exceeds 35 minutes.

## 2019-02-06 NOTE — ED TRIAGE NOTES
Patient arrives via EMS. Per report, patient was discharged today after 4 day stay in hospital due to kidney infection. Unable to fill abx prescription today, returns with worsening right flank pain.

## 2019-02-07 LAB
BACTERIA BLD CULT: NORMAL
SIGNIFICANT IND 70042: NORMAL
SITE SITE: NORMAL
SOURCE SOURCE: NORMAL

## 2019-02-08 NOTE — DISCHARGE PLANNING
Pt calling requesting specific information on if her antibiotic is for her blood or urine infection. Call transferred to Nicolette CORTEZ

## 2019-02-12 NOTE — ADDENDUM NOTE
Encounter addended by: Stevie Baker M.D. on: 2/12/2019  1:08 AM<BR>    Actions taken: Sign clinical note

## 2019-02-12 NOTE — DOCUMENTATION QUERY
[facility]                                                                         Query Response Note      PATIENT:               BRIDGER RO  ACCT #:                  [id number]  MRN:                       [id number]  :                       1995  ADMIT DATE:       2019 5:48 PM  DISCH DATE:        2019 12:26 PM  RESPONDING  PROVIDER #:        [id number]           RESPONSE TEXT:    Condition _Demand Ischemia___ is due to or associated with _Sepsis_____    QUERY TEXT:    Cause and Effect Relationship [id number]    Please clarify in documentation the relationship, if any, between ____Demand Ischemia________and____Sepsis_________________    The patient's Clinical Indicators include:  Troponin elevated at 0.12>0.15  E. Coli Sepsis  Pyelonephritis  Meth abuse  Tachycardia  Query created by: Sawallisch, Beth on 2019 4:40 PM        Electronically signed by:  SG JOHNSON MD 2019 1:05 AM
